# Patient Record
Sex: FEMALE | Race: WHITE | NOT HISPANIC OR LATINO | Employment: OTHER | URBAN - METROPOLITAN AREA
[De-identification: names, ages, dates, MRNs, and addresses within clinical notes are randomized per-mention and may not be internally consistent; named-entity substitution may affect disease eponyms.]

---

## 2022-09-07 ENCOUNTER — TRANSFERRED RECORDS (OUTPATIENT)
Dept: HEALTH INFORMATION MANAGEMENT | Facility: CLINIC | Age: 47
End: 2022-09-07

## 2022-09-12 ENCOUNTER — MEDICAL CORRESPONDENCE (OUTPATIENT)
Dept: HEALTH INFORMATION MANAGEMENT | Facility: CLINIC | Age: 47
End: 2022-09-12

## 2022-09-14 ENCOUNTER — TRANSCRIBE ORDERS (OUTPATIENT)
Dept: OTHER | Age: 47
End: 2022-09-14

## 2022-09-14 DIAGNOSIS — G93.5 CHIARI MALFORMATION TYPE I (H): Primary | ICD-10-CM

## 2022-09-14 DIAGNOSIS — G95.0 SYRINGOMYELIA (H): ICD-10-CM

## 2022-09-16 ENCOUNTER — TELEPHONE (OUTPATIENT)
Dept: NEUROSURGERY | Facility: CLINIC | Age: 47
End: 2022-09-16

## 2022-09-16 NOTE — TELEPHONE ENCOUNTER
Writer was not able to reach pt regarding neurosurgery referral    Please schedule a new, in person, visit with Dr. Hooks for next available in Peds neurosurgery    Luzmaria mena

## 2022-10-10 ENCOUNTER — PRE VISIT (OUTPATIENT)
Dept: NEUROSURGERY | Facility: CLINIC | Age: 47
End: 2022-10-10

## 2022-10-10 NOTE — TELEPHONE ENCOUNTER
Action 10/10/22  8:14AM   Action Taken Writer faxed request to [facility] at [phone] for all images to be pushed via PACS     Action 10/17/9:18am   Action Taken Writer faxed second request to [facility] at [phone] asking for all images to be pushed

## 2022-10-25 ENCOUNTER — OFFICE VISIT (OUTPATIENT)
Dept: NEUROSURGERY | Facility: CLINIC | Age: 47
End: 2022-10-25
Attending: FAMILY MEDICINE
Payer: COMMERCIAL

## 2022-10-25 VITALS
SYSTOLIC BLOOD PRESSURE: 119 MMHG | BODY MASS INDEX: 22.48 KG/M2 | HEART RATE: 118 BPM | WEIGHT: 134.92 LBS | DIASTOLIC BLOOD PRESSURE: 76 MMHG | HEIGHT: 65 IN

## 2022-10-25 DIAGNOSIS — G93.5 CHIARI MALFORMATION TYPE I (H): ICD-10-CM

## 2022-10-25 DIAGNOSIS — G95.0 SYRINGOMYELIA (H): ICD-10-CM

## 2022-10-25 PROCEDURE — G0463 HOSPITAL OUTPT CLINIC VISIT: HCPCS

## 2022-10-25 PROCEDURE — 99207 PR SC NO CHARGE VISIT: CPT | Performed by: NEUROLOGICAL SURGERY

## 2022-10-25 PROCEDURE — 99204 OFFICE O/P NEW MOD 45 MIN: CPT | Performed by: NEUROLOGICAL SURGERY

## 2022-10-25 RX ORDER — ZOLPIDEM TARTRATE 5 MG/1
5 TABLET ORAL
COMMUNITY
Start: 2022-10-10

## 2022-10-25 RX ORDER — LORAZEPAM 1 MG/1
1 TABLET ORAL
COMMUNITY
Start: 2022-10-10 | End: 2022-11-13

## 2022-10-25 RX ORDER — RISPERIDONE 1 MG/1
2 TABLET ORAL
COMMUNITY
Start: 2022-09-21 | End: 2023-09-26

## 2022-10-25 NOTE — PATIENT INSTRUCTIONS
You met with Pediatric Neurosurgery at the [facility]    SIGIFREDO [address]on [address]., Dr., NP      Pediatric Appointment Scheduling and Call Center:   [phone]    Nurse Practitioner  [phone]    Mailing Address  [address] [address]    Street Address   [address] [place] [address]    Fax Number  [phone]    For urgent matters that cannot wait until the next business day, occur over a holiday and/or weekend, report directly to your nearest ER or you may call [phone] and ask to page the Pediatric Neurosurgery Resident on call.

## 2022-10-25 NOTE — NURSING NOTE
"Chief Complaint   Patient presents with     Consult     \"Symptoms coming back after decompression surgery 10 year ago\"       Vitals:    10/25/22 1413   BP: 119/76   BP Location: Left arm   Patient Position: Sitting   Cuff Size: Adult Regular   Pulse: 118   Weight: 134 lb 14.7 oz (61.2 kg)   Height: 5' 4.76\" (164.5 cm)       Juliette Shabazz, EMT   October 25, 2022  "

## 2022-11-01 ENCOUNTER — TELEPHONE (OUTPATIENT)
Dept: NEUROSURGERY | Facility: CLINIC | Age: 47
End: 2022-11-01

## 2022-11-01 NOTE — TELEPHONE ENCOUNTER
Writer CÉSAR for pt to call back and schedule a follow up    Please schedule a return, video visit with Dr. Hooks for next available    Luzmaria Almaraz

## 2022-11-13 DIAGNOSIS — G93.5 CHIARI MALFORMATION TYPE I (H): Primary | ICD-10-CM

## 2022-11-13 DIAGNOSIS — F41.9 ANXIETY DUE TO INVASIVE PROCEDURE: ICD-10-CM

## 2022-11-13 RX ORDER — LORAZEPAM 1 MG/1
1 TABLET ORAL
Qty: 2 TABLET | Refills: 0 | Status: SHIPPED | OUTPATIENT
Start: 2022-11-13

## 2022-11-16 ENCOUNTER — HOSPITAL ENCOUNTER (OUTPATIENT)
Dept: MRI IMAGING | Facility: CLINIC | Age: 47
Discharge: HOME | End: 2022-11-16
Attending: NURSE PRACTITIONER
Payer: COMMERCIAL

## 2022-11-16 ENCOUNTER — HOSPITAL ENCOUNTER (OUTPATIENT)
Dept: SPEECH THERAPY | Facility: CLINIC | Age: 47
Setting detail: THERAPIES SERIES
Discharge: HOME | End: 2022-11-16
Attending: NURSE PRACTITIONER
Payer: COMMERCIAL

## 2022-11-16 DIAGNOSIS — G95.0 SYRINGOMYELIA (H): ICD-10-CM

## 2022-11-16 DIAGNOSIS — G93.5 CHIARI MALFORMATION TYPE I (H): ICD-10-CM

## 2022-11-16 PROCEDURE — 92610 EVALUATE SWALLOWING FUNCTION: CPT | Mod: GN | Performed by: SPEECH-LANGUAGE PATHOLOGIST

## 2022-11-16 PROCEDURE — 70551 MRI BRAIN STEM W/O DYE: CPT

## 2022-11-16 PROCEDURE — 72141 MRI NECK SPINE W/O DYE: CPT

## 2022-11-16 PROCEDURE — 72148 MRI LUMBAR SPINE W/O DYE: CPT

## 2022-11-16 PROCEDURE — 72146 MRI CHEST SPINE W/O DYE: CPT

## 2022-11-16 NOTE — PROGRESS NOTES
Impression: Oral [facility] exam is unremarkable. Patient demonstrates light throat clearing throughout session even prior to PO intake. Throat clearing increases with PO intake and Patient reports she had never noticed before. She denies feeling anything is stuck in her throat or that things are going down the wrong way but reports  it feels thick . She denies weight loss, globus sensation. Educated on symptoms and risks of aspiration and option to pursue a VFSS. Patient reports she would like to hold off on VFSS until MRIs are completed and she discusses again with her MD. Recommend continue with regular diet and thin liquids as it is unclear how strongly symptoms are related to swallow safety vs irritable larynx or other and Patient reports symptoms are not new and no significant history of pneumonia. Recommend Patient discuss option of pursuing a video swallow study with her primary doctor to rule out aspiration.   Clinical Swallow Study  11/16/22   General Information   Type Of Visit Initial   Start Of Care Date 11/16/22   Referring Physician Keri Jesus NP   Orders Evaluate And Treat   Medical Diagnosis Chiari malformation type I (H) (G93.5) ; Syringomyelia (H) (G95.0)   Onset Of Illness/injury Or Date Of Surgery 10/25/22  (order's date)   Precautions/limitations No Known Precautions/limitations   Hearing WFL   Pertinent History of Current Problem/OT: Additional Occupational Profile Info Per chart review  Nevaeh underwent a chiari decompression with fascia juan graft with duraplasty approximately 10 years ago in [facility] with Dr. Horn. She notes that after her surgery, she had resolution of her symptoms. She notes that approximately 5 years ago, her symptoms slowly started to come back, and notes that in the past 1 year, they have significantly gotten worse  and is doing well. She does note that she snores, has never had a sleep study. She also notes some swallowing difficulty, she said she will  "choke sometimes on her spit/thin liquids, no gag.  She is eating well, although she states that she will intermittently vomit and gag.  Patient reports choking episodes are random and she doesn t believe they are related to PO intake. She also denies feeling she is choking on saliva or that anything is going into her airway. She reports period of being unable to initiate swallow and feels this is related to her choking.  She reports she has to wait for a while and then her swallow will initiate. This causes her anxiety as she cannot communicate it while it is happening. Patient reports she had pneumonia 6 weeks ago but her doctor caught it before it impacted her lungs and doesn t believe it is related to aspiration. She is eating well and not losing weight. She says her choking episodes last about 10-20 seconds and then resolve and it happens 1-2 times a day.   Respiratory Status Room air   Patient Role/employment History Other/comments  (mily)   General Observations Patient is pleasant and cooperative and attends evaluation with her . She reports she \"doesn't know why she has this appointment\".   Patient/family Goals To figure out why she can't swallow and chokes   Clinical Swallow Evaluation   Oral Musculature generally intact   Structural Abnormalities none present   Dentition present and adequate   Mucosal Quality good   Mandibular Strength and Mobility intact   Oral Labial Strength and Mobility WFL   Lingual Strength and Mobility WFL   Velar Elevation intact   Buccal Strength and Mobility intact   Laryngeal Function Throat clear   Clinical Swallow Eval: Thin Liquid Texture Trial   Mode of Presentation, Thin Liquids cup;self-fed   Volume of Liquid or Food Presented 4 oz of applejuice   Oral Phase of Swallow WFL   Pharyngeal Phase of Swallow throat clearing   Diagnostic Statement Patient throat clears before bolus enters her mouth. She throat clears after as well on 80% of trials.   Clinical Swallow " Eval: Puree Solid Texture Trial   Mode of Presentation, Puree spoon;self-fed   Volume of Puree Presented 4 tablespoons   Oral Phase, Puree WFL   Pharyngeal Phase, Puree throat clearing   Diagnostic Statement Patient throat clears before bolus enters her mouth. She throat clears after as well on 80% of trials.   Clinical Swallow Eval: Regular (Solid)   Mode of Presentation self-fed   Volume Presented 1 cyn cracker   Oral Phase WFL   Pharyngeal Phase throat clearing   Diagnostic Statement Patient throat clears before bolus enters her mouth. She throat clears after as well on 80% of trials.   Educational Assessment   Barriers to Learning No barriers   Esophageal Phase of Swallow   Patient reports or presents with symptoms of esophageal dysphagia No   Swallow Eval: Clinical Impressions   Skilled Criteria for Therapy Intervention Patient/family refuse skilled intervention at this time;Other (see comments)  (decline VFSS until after MRI)   Diet texture recommendations Thin liquids (level 0);Regular diet   Risks and Benefits of Treatment have been explained. Yes   Patient, family and/or staff in agreement with Plan of Care Yes   Clinical Impression Comments Oral mech exam is unremarkable. Patient demonstrates light throat clearing throughout session even prior to PO intake. Throat clearing increases with PO intake and Patient reports she had never noticed before. She denies feeling anything is stuck in her throat or that things are going down the wrong way but reports  it feels thick . She denies weight loss, globus sensation. Educated on symptoms and risks of aspiration and option to pursue a VFSS. Patient reports she would like to hold off on VFSS until MRIs are completed and she discusses again with her MD. Recommend continue with regular diet and thin liquids as it is unclear how strongly symptoms are related to swallow safety vs irritable larynx or other and Patient reports symptoms are not new and no significant  history of pneumonia. Recommend Patient discuss option of pursuing a video swallow study with her primary doctor to rule out aspiration.   Total Session Time   Total Evaluation Time 45   Therapy Certification   Certification date from 11/16/22   Certification date to 11/16/22   Medical Diagnosis Chiari malformation type I (H) (G93.5) ; Syringomyelia (H) (G95.0)   Certification I certify the need for these services furnished under this plan of treatment and while under my care.  (Physician co-signature of this document indicates review and certification of the therapy plan).

## 2022-11-29 ENCOUNTER — TELEPHONE (OUTPATIENT)
Dept: NEUROSURGERY | Facility: CLINIC | Age: 47
End: 2022-11-29

## 2022-11-29 NOTE — TELEPHONE ENCOUNTER
Writer spoke with pt regarding a follow up with Dr. Hooks. Pt asked about sleep study and became frustrated that a consult is needed prior to the study. The pt said she would call back to discuss at another time.    Please send a message via Teams or TE to writer with best time to call pt back.    Luzmaria Almaraz

## 2022-12-22 NOTE — PROGRESS NOTES
Neurosurgery Clinic    Dear Dr. Ellison,   Thank you for referring Nevaeh Graham to the neurosurgery clinic at the [facility].   I had the opportunity to meet with Nevaeh Graham on October 25, 2022.    As you know, Nevaeh is a 47 year old female who presents today for initial consultation and discussion of chiari malformation. Nevaeh underwent a chiari decompression with fascia juan graft with duraplasty approximately 10 years ago in [facility] with Dr. Horn. She notes that after her surgery, she had resolution of her symptoms. She notes that approximately 5 years ago, her symptoms slowly started to come back, and notes that in the past 1 year, they have significantly gotten worse. She has 2 types of headaches, one at the base of her skull that feels like a squeezing pain that radiates upwards bands around her head that is significantly worse with valsalva, pushing/pulling activities. She reports those occur approximately 3-10 times per day. She notes an additional type of headache with lots of pressure at her neck, she feels like she has to dig her fingers to release her pressure, which occur 3x/week. She notes nothing typically helps her pain, although she tries rest, works through it or applies cold packs. She notes that massage doesn't help typically. She does note dizziness when she wakes up in the morning approximately 50% of the time when she opens her eyes, but notes that in the morning she has to lie in bed for approximately 1 hour before getting up to see if she can combat this symptom. She states if she does have dizziness, she will typically get headaches with those as well. She does not have headaches that wake her from sleep. She does note that she has change in her gait and has noted some bilateral leg weakness over the past 6-7 months. She notes that she has had a difficult time walking up stairs, does better walking down. She notes  "numbness/tingling on her right side from shoulders to fingertips, down her sides and to her legs (unsure of how low her pain goes). She does note that she had fallen, slipping on her stairs and hitting the back of her head, as well as slipping and falling on water in her kitchen and sustained concussions. She also notes that in September, she started a new medication and had a drop attack.  She denies any changes in fine motor movement, she is a  and is doing well. She does note that she snores, has never had a sleep study. She also notes some swallowing difficulty, she said she will choke sometimes on her spit/thin liquids, no gag.  She is eating well, although she states that she will intermittently vomit and gag. She does note that she has some confusion, forgetful of words, difficulty processing information. She states that her vision has been changing slightly, saw ophthalmology about 1 year ago, she does not wear glasses. She did mention that she follows closely with psychologist and has been trialing multiple medications      No past medical history on file.       No past surgical history on file.    ALLERGIES:  Latex, Tramadol, Ketorolac tromethamine, and Metoclopramide    Current Outpatient Medications   Medication Sig Dispense Refill     aspirin (ASA) 81 MG EC tablet Take 81 mg by mouth daily       LORazepam (ATIVAN) 1 MG tablet Take 1 mg by mouth       risperiDONE (RISPERDAL) 1 MG tablet Take 2 mg by mouth       zolpidem (AMBIEN) 5 MG tablet Take 5 mg by mouth         No family history on file.    Social History     Tobacco Use     Smoking status: Not on file     Smokeless tobacco: Not on file   Substance Use Topics     Alcohol use: Not on file         PHYSICAL EXAM:   /76 (BP Location: Left arm, Patient Position: Sitting, Cuff Size: Adult Regular)   Pulse 118   Ht 1.645 m (5' 4.76\")   Wt 61.2 kg (134 lb 14.7 oz)   BMI 22.62 kg/m      Awake and alert, no acute distress  PERRL, EOMi, face " symmetrical, tongue midline  No gag reflex  MAEx4, full strength bilaterally  Gait is normal  Reflexes 2+, mildly decreased sensation on right  Posterior neck incision well healed, no redness, swelling or drainage noted    IMAGES: none    ASSESSMENT:  Nevaeh Graham, 47 year old female with history of chiari malformation with duraplasty in 2012 with return and worsening of symptoms    PLAN:  - we will obtain MRI brain including craniocervical junction and full spine and follow up with us after  -attempt to coordinate swallow evaluation and sleep study  - Nevaeh Graham and family were counseled to please contact us with any acute worsening of symptoms, or with any questions or concerns.       Keri Jesus NP  Department of Neurosurgery  [phone]    This patient was discussed with neurosurgery faculty, who agrees with the above.  Keri Jesus NP on 10/25/2022 at 3:00 PM     Include Pregnancy/Lactation Warning?: No

## 2023-02-06 NOTE — PROGRESS NOTES
Does Nevaeh have a CPAP/Bipap?  No     [place] Sleep Scale: 7  BMI: 22.62    Nevaeh is a 47 year old who is being evaluated via a billable video visit.      How would you like to obtain your AVS? MyChart  If the video visit is dropped, the invitation should be resent by: Text to cell phone: [phone]  Will anyone else be joining your video visit? No              Subjective   Nevaeh is a 47 year old, presenting for the following health issues:  Sleep Problem ( Chiari malformation type I Syringomyelia)      Objective           Vitals:  No vitals were obtained today due to virtual visit.    Physical Exam   GENERAL: Healthy, alert and no distress  EYES: Eyes grossly normal to inspection.  No discharge or erythema, or obvious scleral/conjunctival abnormalities.  RESP: No audible wheeze, cough, or visible cyanosis.  No visible retractions or increased work of breathing.    SKIN: Visible skin clear. No significant rash, abnormal pigmentation or lesions.  NEURO: Cranial nerves grossly intact.  Mentation and speech appropriate for age.  PSYCH: Mentation appears normal, affect normal/bright, judgement and insight intact, normal speech and appearance well-groomed.            Video-Visit Details    Type of service:  Video Visit     Originating Location (pt. Location): Home    Distant Location (provider location):  On-site  Platform used for Video Visit: [facility]    Outpatient Sleep Medicine Consultation:      Name: Nevaeh Graham MRN# [id number]   Age: 47 year old YOB: 1975     Date of Consultation: February 8, 2023  Consultation is requested by: Keri Jesus NP  [facility]  [address] [place] [id number] Keri Jesus  Primary care provider: Miroslava Ellison       Reason for Sleep Consult:     Nevaeh Graham is sent by Keri Jesus for a sleep consultation regarding possible sleep apnea. Referred per neurology. Hx Chiari malformation, insomnia, loud snoring, witnessed apneas.  .    Patient s Reason for visit  Nevaeh Graham main reason for visit: Sleep Apnea  Patient states problem(s) started: As long as I can remember  Nevaeh Graham's goals for this visit: To find a solution           Assessment and Plan:     Summary Sleep Diagnoses:  Chronic Insomnia- sleep onset/sleep maintenance  Suspected sleep apnea- increased risk due to Chiari malformation, symptoms consistent with LEYLA.     Comorbid Diagnoses:  Chiari Malformation  Bipolar disorder  HTN  Anxiety/depression  hypothyroidism      Summary Recommendations:  Lab study for further evaluation of multiple sleep co morbidities, suspected sleep apnea related to Chiari malformation.   Insomnia- consider sleep psychology referral after testing is complete  No orders of the defined types were placed in this encounter.        Summary Counseling:    Sleep Testing Reviewed  Obstructive Sleep Apnea Reviewed  Complications of Untreated Sleep Apnea Reviewed      Medical Decision-making:   Educational materials provided in instructions    Total time spent reviewing medical records, history and physical examination, review of previous testing and interpretation as well as documentation on this date:50 min    CC: Keri Jesus          History of Present Illness:     Past Sleep Evaluations:    SLEEP-WAKE SCHEDULE:     Work/School Days: Patient goes to school/work: Yes   Usually gets into bed at 10  Takes patient about 2 hours to fall asleep  Has trouble falling asleep 7 nights per week  Wakes up in the middle of the night 3-4 times times.  Wakes up due to Snorting self awake;Anxiety  She has trouble falling back asleep 7 times a week.   It usually takes 30/45 minutes to get back to sleep  Patient is usually up at 8-9 am  Uses alarm: Yes    Weekends/Non-work Days/All Other Days:  Usually gets into bed at 9:00   Takes patient about 2-3 Hours to fall asleep  Patient is usually up at 8/9 am  Uses alarm: Yes    Sleep Need  Patient gets  10 Hours sleep on  average   Patient thinks she needs about 10-11 Hours sleep    Nevaeh Graham prefers to sleep in this position(s): Side   Patient states they do the following activities in bed: Read    Naps  Patient takes a purposeful nap 4 times a week and naps are usually 2 hours in duration  She feels better after a nap: No  She dozes off unintentionally 0 days per week  Patient has had a driving accident or near-miss due to sleepiness/drowsiness: No      SLEEP DISRUPTIONS:    Breathing/Snoring  Patient snores:Yes  Other people complain about her snoring: Yes  Patient has been told she stops breathing in her sleep:Yes  She has issues with the following: Morning mouth dryness;Stuffy nose when you wake up    Movement:  Patient gets pain, discomfort, with an urge to move:  No  It happens when she is resting:  No  It happens more at night:  No  Patient has been told she kicks her legs at night:  No     Behaviors in Sleep:  Nevaeh Graham has experienced the following behaviors while sleeping:    She has experienced sudden muscle weakness during the day: Yes      Is there anything else you would like your sleep provider to know:          CAFFEINE AND OTHER SUBSTANCES:    Patient consumes caffeinated beverages per day:  2 Diet Cokes  Last caffeine use is usually: 5 pm  List of any prescribed or over the counter stimulants that patient takes: Ambien  List of any prescribed or over the counter sleep medication patient takes: Tylenol PM  List of previous sleep medications that patient has tried:    Patient drinks alcohol to help them sleep: No  Patient drinks alcohol near bedtime: No    Family History:  Patient has a family member been diagnosed with a sleep disorder: Yes  Mother and Father         SCALES:    EPWORTH SLEEPINESS SCALE      [place] Sleepiness Scale ( OSCAR [facility]  6538-2041<br>ESS - [place]/English - Final version - 21 Nov 07 - [facility] [place].) 2/8/2023   Sitting and reading Would never doze   Watching TV Moderate  chance of dozing   Sitting, inactive in a public place (e.g. a theatre or a meeting) Would never doze   As a passenger in a car for an hour without a break Slight chance of dozing   Lying down to rest in the afternoon when circumstances permit High chance of dozing   Sitting and talking to someone Would never doze   Sitting quietly after a lunch without alcohol Slight chance of dozing   In a car, while stopped for a few minutes in traffic Would never doze   [place] Score (MC) 7   [place] Score (Sleep) 7         INSOMNIA SEVERITY INDEX (SHANE)      Insomnia Severity Index (SHANE) 2/6/2023   Difficulty falling asleep 3   Difficulty staying asleep 4   Problems waking up too early 3   How SATISFIED/DISSATISFIED are you with your CURRENT sleep pattern? 4   How NOTICEABLE to others do you think your sleep problem is in terms of impairing the quality of your life? 3   How WORRIED/DISTRESSED are you about your current sleep problem? 4   To what extent do you consider your sleep problem to INTERFERE with your daily functioning (e.g. daytime fatigue, mood, ability to function at work/daily chores, concentration, memory, mood, etc.) CURRENTLY? 4   SHANE Total Score 25       Guidelines for Scoring/Interpretation:  Total score categories:  0-7 = No clinically significant insomnia   8-14 = Subthreshold insomnia   15-21 = Clinical insomnia (moderate severity)  22-28 = Clinical insomnia (severe)  Used via courtesy of www.[employer].va.gov with permission from Yunier Madsen PhD., [facility]      STOP BANG     STOP BANG Questionnaire (  2008, the American Society of Anesthesiologists, Inc. [employer] 2/8/2023   1. Snoring - Do you snore loudly (louder than talking or loud enough to be heard through closed doors)? Yes   2. Tired - Do you often feel tired, fatigued, or sleepy during daytime? Yes   3. Observed - Has anyone observed you stop breathing during your sleep? Yes   4. Blood pressure - Do you have or  "are you being treated for high blood pressure? Yes   5. BMI - BMI more than 35 kg/m2? No   6. Age - Age over 50 yr old? No   7. Neck circumference - Neck circumference greater than 40 cm? No   8. Gender - Gender male? No   STOP BANG Score (MC): 5 (High risk of LEYLA)   B/P Clinic: -   BMI Clinic: -         GAD7    No flowsheet data found.      CAGE-AID    No flowsheet data found.    CAGE-AID reprinted with permission from the [place] Medical Journal, CINDY Berry. and MARIBEL Luong, \"Conjoint screening questionnaires for alcohol and drug abuse\" [place] Medical Journal 94: 135-140, 1995.      PATIENT HEALTH QUESTIONNAIRE-9 (PHQ - 9)    No flowsheet data found.    Developed by James Boykin, Joleen Schaffer, Rolando Belcher and colleagues, with an educational pato from [employer] No permission required to reproduce, translate, display or distribute.        Allergies:    Allergies   Allergen Reactions     Latex Hives and Rash     Tramadol Other (See Comments)     agitation and visual disturbance     Ketorolac Tromethamine Other (See Comments)     agitation       Metoclopramide Other (See Comments)     agitation         Medications:    Current Outpatient Medications   Medication Sig Dispense Refill     aspirin (ASA) 81 MG EC tablet Take 81 mg by mouth daily       LORazepam (ATIVAN) 1 MG tablet Take 1 tablet (1 mg) by mouth once as needed for anxiety 2 tablet 0     risperiDONE (RISPERDAL) 1 MG tablet Take 2 mg by mouth       zolpidem (AMBIEN) 5 MG tablet Take 5 mg by mouth         Problem List:  There are no problems to display for this patient.       Past Medical/Surgical History:  No past medical history on file.  No past surgical history on file.    Social History:  Social History     Socioeconomic History     Marital status:      Spouse name: Not on file     Number of children: Not on file     Years of education: Not on file     Highest education level: Not on file   Occupational History     Not on " file   Tobacco Use     Smoking status: Not on file     Smokeless tobacco: Not on file   Substance and Sexual Activity     Alcohol use: Not on file     Drug use: Not on file     Sexual activity: Not on file   Other Topics Concern     Not on file   Social History Narrative     Not on file     Social Determinants of Health     Financial Resource Strain: Not on file   Food Insecurity: Not on file   Transportation Needs: Not on file   Physical Activity: Not on file   Stress: Not on file   Social Connections: Not on file   Intimate Partner Violence: Not on file   Housing Stability: Not on file       Family History:  No family history on file.    Review of Systems:  A complete review of systems reviewed by me is negative with the exeption of what has been mentioned in the history of present illness.  In the last TWO WEEKS have you experienced any of the following symptoms?  Fevers: No  Night Sweats: Yes  Weight Gain: Yes  Pain at Night: No  Double Vision: Yes  Changes in Vision: Yes  Difficulty Breathing through Nose: Yes  Sore Throat in Morning: Yes  Dry Mouth in the Morning: Yes  Shortness of Breath Lying Flat: Yes  Shortness of Breath With Activity: Yes  Awakening with Shortness of Breath: No  Increased Cough: No  Heart Racing at Night: Yes  Swelling in Feet or Legs: No  Diarrhea at Night: No  Heartburn at Night: No  Urinating More than Once at Night: No  Losing Control of Urine at Night: No  Joint Pains at Night: No  Headaches in Morning: No  Weakness in Arms or Legs: No  Depressed Mood: Yes  Anxiety: Yes     Physical Examination:  Vitals: There were no vitals taken for this visit.  BMI= There is no height or weight on file to calculate BMI.                    Data: All pertinent previous laboratory data reviewed     No results for input(s): NA, POTASSIUM, CHLORIDE, CO2, ANIONGAP, GLC, BUN, CR, BELLA in the last 11810 hours.    No results for input(s): WBC, RBC, HGB, HCT, MCV, MCH, MCHC, RDW, PLT in the last 60098  hours.    No results for input(s): PROTTOTAL, ALBUMIN, BILITOTAL, ALKPHOS, AST, ALT, BILIDIRECT in the last 62927 hours.    No results found for: TSH    No results found for: UAMP, UBARB, BENZODIAZEUR, UCANN, UCOC, OPIT, UPCP    No results found for: IRONSAT, [id number], KAN    No results found for: PH, PHARTERIAL, PO2, [id number], SAT, PCO2, HCO3, BASEEXCESS, RENAY, BEB    @LABRCNTIPR(phv:4,pco2v:4,po2v:4,hco3v:4,kylah:4,o2per:4)@    Echocardiology: No results found for this or any previous visit (from the past 4320 hour(s)).    Chest x-ray: No results found for this or any previous visit from the past 365 days.      Chest CT: No results found for this or any previous visit from the past 365 days.      PFT: Most Recent [facility] Pulmonary Function Testing    No results found for: 20001  No results found for: 20002  No results found for: 20003  No results found for: 20015  No results found for: 20016  No results found for: 20027  No results found for: 20028  No results found for: 20029  No results found for: 20079  No results found for: 20080  No results found for: 20081  No results found for: 20335  No results found for: 20105  No results found for: 20053  No results found for: 20054  No results found for: 20055      Nena Leonardo CMA 2/8/2023

## 2023-02-08 ENCOUNTER — VIRTUAL VISIT (OUTPATIENT)
Dept: SLEEP MEDICINE | Facility: CLINIC | Age: 48
End: 2023-02-08
Attending: NURSE PRACTITIONER

## 2023-02-08 DIAGNOSIS — G47.10 HYPERSOMNIA: ICD-10-CM

## 2023-02-08 DIAGNOSIS — G93.5 CHIARI MALFORMATION TYPE I (H): ICD-10-CM

## 2023-02-08 DIAGNOSIS — F51.04 PSYCHOPHYSIOLOGICAL INSOMNIA: ICD-10-CM

## 2023-02-08 DIAGNOSIS — R29.818 SUSPECTED SLEEP APNEA: Primary | ICD-10-CM

## 2023-02-08 DIAGNOSIS — G95.0 SYRINGOMYELIA (H): ICD-10-CM

## 2023-02-08 PROCEDURE — 99204 OFFICE O/P NEW MOD 45 MIN: CPT | Mod: 95 | Performed by: PHYSICIAN ASSISTANT

## 2023-02-08 ASSESSMENT — SLEEP AND FATIGUE QUESTIONNAIRES
HOW LIKELY ARE YOU TO NOD OFF OR FALL ASLEEP WHILE SITTING QUIETLY AFTER LUNCH WITHOUT ALCOHOL: SLIGHT CHANCE OF DOZING
HOW LIKELY ARE YOU TO NOD OFF OR FALL ASLEEP WHILE SITTING AND TALKING TO SOMEONE: WOULD NEVER DOZE
HOW LIKELY ARE YOU TO NOD OFF OR FALL ASLEEP WHILE WATCHING TV: MODERATE CHANCE OF DOZING
HOW LIKELY ARE YOU TO NOD OFF OR FALL ASLEEP WHILE LYING DOWN TO REST IN THE AFTERNOON WHEN CIRCUMSTANCES PERMIT: HIGH CHANCE OF DOZING
HOW LIKELY ARE YOU TO NOD OFF OR FALL ASLEEP IN A CAR, WHILE STOPPED FOR A FEW MINUTES IN TRAFFIC: WOULD NEVER DOZE
HOW LIKELY ARE YOU TO NOD OFF OR FALL ASLEEP WHILE SITTING AND READING: WOULD NEVER DOZE
HOW LIKELY ARE YOU TO NOD OFF OR FALL ASLEEP WHILE SITTING INACTIVE IN A PUBLIC PLACE: WOULD NEVER DOZE
HOW LIKELY ARE YOU TO NOD OFF OR FALL ASLEEP WHEN YOU ARE A PASSENGER IN A CAR FOR AN HOUR WITHOUT A BREAK: SLIGHT CHANCE OF DOZING

## 2023-02-08 NOTE — PATIENT INSTRUCTIONS
"      MY TREATMENT INFORMATION FOR SLEEP APNEA-  Nevaeh Graham    DOCTOR : SANTANA Pace-TORSTEN    Am I having a sleep study at a sleep center?  --->Due to normal delays, you will be contacted within 2-4 weeks to schedule    Am I having a home sleep study?  --->Watch the video for the device you are using:    -/drop off device-   https://www.FieldAware.com/watch?v=yGGFBdELGhk    -Disposable device sent out require phone/computer application-   https://www.FieldAware.com/watch?v=BCce_vbiwxE      Frequently asked questions:  1. What is Obstructive Sleep Apnea (LEYLA)? LEYLA is the most common type of sleep apnea. Apnea means, \"without breath.\"  Apnea is most often caused by narrowing or collapse of the upper airway as muscles relax during sleep.   Almost everyone has occasional apneas. Most people with sleep apnea have had brief interruptions at night frequently for many years.  The severity of sleep apnea is related to how frequent and severe the events are.   2. What are the consequences of LEYLA? Symptoms include: feeling sleepy during the day, snoring loudly, gasping or stopping of breathing, trouble sleeping, and occasionally morning headaches or heartburn at night.  Sleepiness can be serious and even increase the risk of falling asleep while driving. Other health consequences may include development of high blood pressure and other cardiovascular disease in persons who are susceptible. Untreated LEYLA  can contribute to heart disease, stroke and diabetes.   3. What are the treatment options? In most situations, sleep apnea is a lifelong disease that must be managed with daily therapy. Medications are not effective for sleep apnea and surgery is generally not considered until other therapies have been tried. Your treatment is your choice . Continuous Positive Airway (CPAP) works right away and is the therapy that is effective in nearly everyone. An oral device to hold your jaw forward is usually the next most reliable " option. Other options include postioning devices (to keep you off your back), weight loss, and surgery including a tongue pacing device. There is more detail about some of these options below.  4. Are my sleep studies covered by insurance? Although we will request verification of coverage, we advise you also check in advance of the study to ensure there is coverage.    Important tips for those choosing CPAP and similar devices   Know your equipment:  CPAP is continuous positive airway pressure that prevents obstructive sleep apnea by keeping the throat from collapsing while you are sleeping. In most cases, the device is  smart  and can slowly self-adjusts if your throat collapses and keeps a record every day of how well you are treated-this information is available to you and your care team.  BPAP is bilevel positive airway pressure that keeps your throat open and also assists each breath with a pressure boost to maintain adequate breathing.  Special kinds of BPAP are used in patients who have inadequate breathing from lung or heart disease. In most cases, the device is  smart  and can slowly self-adjusts to assist breathing. Like CPAP, the device keeps a record of how well you are treated.  Your mask is your connection to the device. You get to choose what feels most comfortable and the staff will help to make sure if fits. Here: are some examples of the different masks that are available:       Key points to remember on your journey with sleep apnea:  Sleep study.  PAP devices often need to be adjusted during a sleep study to show that they are effective and adjusted right.  Good tips to remember: Try wearing just the mask during a quiet time during the day so your body adapts to wearing it. A humidifier is recommended for comfort in most cases to prevent drying of your nose and throat. Allergy medication from your provider may help you if you are having nasal congestion.  Getting settled-in. It takes more than  one night for most of us to get used to wearing a mask. Try wearing just the mask during a quiet time during the day so your body adapts to wearing it. A humidifier is recommended for comfort in most cases. Our team will work with you carefully on the first day and will be in contact within 4 days and again at 2 and 4 weeks for advice and remote device adjustments. Your therapy is evaluated by the device each day.   Use it every night. The more you are able to sleep naturally for 7-8 hours, the more likely you will have good sleep and to prevent health risks or symptoms from sleep apnea. Even if you use it 4 hours it helps. Occasionally all of us are unable to use a medical therapy, in sleep apnea, it is not dangerous to miss one night.   Communicate. Call our skilled team on the number provided on the first day if your visit for problems that make it difficult to wear the device. Over 2 out of 3 patients can learn to wear the device long-term with help from our team. Remember to call our team or your sleep providers if you are unable to wear the device as we may have other solutions for those who cannot adapt to mask CPAP therapy. It is recommended that you sleep your sleep provider within the first 3 months and yearly after that if you are not having problems.   Use it for your health. We encourage use of CPAP masks during daytime quiet periods to allow your face and brain to adapt to the sensation of CPAP so that it will be a more natural sensation to awaken to at night or during naps. This can be very useful during the first few weeks or months of adapting to CPAP though it does not help medically to wear CPAP during wakefulness and  should not be used as a strategy just to meet guidelines.  Take care of your equipment. Make sure you clean your mask and tubing using directions every day and that your filter and mask are replaced as recommended or if they are not working.     BESIDES CPAP, WHAT OTHER THERAPIES  ARE THERE?    Positioning Device  Positioning devices are generally used when sleep apnea is mild and only occurs on your back.This example shows a pillow that straps around the waist. It may be appropriate for those whose sleep study shows milder sleep apnea that occurs primarily when lying flat on one's back. Preliminary studies have shown benefit but effectiveness at home may need to be verified by a home sleep test. These devices are generally not covered by medical insurance.  Examples of devices that maintain sleeping on the back to prevent snoring and mild sleep apnea.    Belt type body positioner  http://[email or web address]    Electronic reminder  http://nightshifttherapy.com/            Oral Appliance  What is oral appliance therapy?  An oral appliance device fits on your teeth at night like a retainer used after having braces. The device is made by a specialized dentist and requires several visits over 1-2 months before a manufactured device is made to fit your teeth and is adjusted to prevent your sleep apnea. Once an oral device is working properly, snoring should be improved. A home sleep test may be recommended at that time if to determine whether the sleep apnea is adequately treated.       Some things to remember:  -Oral devices are often, but not always, covered by your medical insurance. Be sure to check with your insurance provider.   -If you are referred for oral therapy, you will be given a list of specialized dentists to consider or you may choose to visit the Web site of the American Academy of Dental Sleep Medicine  -Oral devices are less likely to work if you have severe sleep apnea or are extremely overweight.     More detailed information  An oral appliance is a small acrylic device that fits over the upper and lower teeth  (similar to a retainer or a mouth guard). This device slightly moves jaw forward, which moves the base of the tongue forward, opens the airway, improves breathing for  effective treat snoring and obstructive sleep apnea in perhaps 7 out of 10 people .  The best working devices are custom-made by a dental device  after a mold is made of the teeth 1, 2, 3.  When is an oral appliance indicated?  Oral appliance therapy is recommended as a first-line treatment for patients with primary snoring, mild sleep apnea, and for patients with moderate sleep apnea who prefer appliance therapy to use of CPAP4, 5. Severity of sleep apnea is determined by sleep testing and is based on the number of respiratory events per hour of sleep.   How successful is oral appliance therapy?  The success rate of oral appliance therapy in patients with mild sleep apnea is 75-80% while in patients with moderate sleep apnea it is 50-70%. The chance of success in patients with severe sleep apnea is 40-50%. The research also shows that oral appliances have a beneficial effect on the cardiovascular health of LEYLA patients at the same magnitude as CPAP therapy7.  Oral appliances should be a second-line treatment in cases of severe sleep apnea, but if not completely successful then a combination therapy utilizing CPAP plus oral appliance therapy may be effective. Oral appliances tend to be effective in a broad range of patients although studies show that the patients who have the highest success are females, younger patients, those with milder disease, and less severe obesity. 3, 6.   Finding a dentist that practices dental sleep medicine  Specific training is available through the American Academy of Dental Sleep Medicine for dentists interested in working in the field of sleep. To find a dentist who is educated in the field of sleep and the use of oral appliances, near you, visit the Web site of the American Academy of Dental Sleep Medicine.    References  1. Hugo et al. Objectively measured vs self-reported compliance during oral appliance therapy for sleep-disordered breathing. Chest 2013; 144(5):  3635-4955.  2. Loulou et al. Objective measurement of compliance during oral appliance therapy for sleep-disordered breathing. Thorax 2013; 68(1): 91-96.  3. Jose Luis et al. Mandibular advancement devices in 620 men and women with LEYLA and snoring: tolerability and predictors of treatment success. Chest 2004; 125: 4060-6488.  4. Angelica, et al. Oral appliances for snoring and LEYLA: a review. Sleep 2006; 29: 244-262.  5. North et al. Oral appliance treatment for LEYLA: an update. J Clin Sleep Med 2014; 10(2): 215-227.  6. Brisa et al. Predictors of OSAH treatment outcome. J Dent Res 2007; 86: 8950-4885.      Weight Loss:    Weight loss is a long-term strategy that may improve sleep apnea in some patients.    Weight management is a personal decision and the decision should be based on your interest and the potential benefits.  If you are interested in exploring weight loss strategies, the following discussion covers the impact on weight loss on sleep apnea and the approaches that may be successful.    Being overweight does not necessarily mean you will have health consequences.  Those who have BMI over 35 or over 27 with existing medical conditions carries greater risk.   Weight loss decreases severity of sleep apnea in most people with obesity. For those with mild obesity who have developed snoring with weight gain, even 15-30 pound weight loss can improve and occasionally eliminate sleep apnea.  Structured and life-long dietary and health habits are necessary to lose weight and keep healthier weight levels.     Though there may be significant health benefits from weight loss, long-term weight loss is very difficult to achieve- studies show success with dietary management in less than 10% of people. In addition, substantial weight loss may require years of dietary control and may be difficult if patients have severe obesity. In these cases, surgical management may be considered.  Finally, older  individuals who have tolerated obesity without health complications may be less likely to benefit from weight loss strategies.      [unfilled]    Surgery:    Surgery for obstructive sleep apnea is considered generally only when other therapies fail to work. Surgery may be discussed with you if you are having a difficult time tolerating CPAP and or when there is an abnormal structure that requires surgical correction.  Nose and throat surgeries often enlarge the airway to prevent collapse.  Most of these surgeries create pain for 1-2 weeks and up to half of the most common surgeries are not effective throughout life.  You should carefully discuss the benefits and drawbacks to surgery with your sleep provider and surgeon to determine if it is the best solution for you.   More information  Surgery for LEYLA is directed at areas that are responsible for narrowing or complete obstruction of the airway during sleep.  There are a wide range of procedures available to enlarge and/or stabilize the airway to prevent blockage of breathing in the three major areas where it can occur: the palate, tongue, and nasal regions.  Successful surgical treatment depends on the accurate identification of the factors responsible for obstructive sleep apnea in each person.  A personalized approach is required because there is no single treatment that works well for everyone.  Because of anatomic variation, consultation with an examination by a sleep surgeon is a critical first step in determining what surgical options are best for each patient.  In some cases, examination during sedation may be recommended in order to guide the selection of procedures.  Patients will be counseled about risks and benefits as well as the typical recovery course after surgery. Surgery is typically not a cure for a person s LEYLA.  However, surgery will often significantly improve one s LEYLA severity (termed  success rate ).  Even in the absence of a cure, surgery  will decrease the cardiovascular risk associated with OSA7; improve overall quality of life8 (sleepiness, functionality, sleep quality, etc).      Palate Procedures:  Patients with LEYLA often have narrowing of their airway in the region of their tonsils and uvula.  The goals of palate procedures are to widen the airway in this region as well as to help the tissues resist collapse.  Modern palate procedure techniques focus on tissue conservation and soft tissue rearrangement, rather than tissue removal.  Often the uvula is preserved in this procedure. Residual sleep apnea is common in patient after pharyngoplasty with an average reduction in sleep apnea events of 33%2.      Tongue Procedures:  ExamWhile patients are awake, the muscles that surround the throat are active and keep this region open for breathing. These muscles relax during sleep, allowing the tongue and other structures to collapse and block breathing.  There are several different tongue procedures available.  Selection of a tongue base procedure depends on characteristics seen on physical exam.  Generally, procedures are aimed at removing bulky tissues in this area or preventing the back of the tongue from falling back during sleep.  Success rates for tongue surgery range from 50-62%3.    Hypoglossal Nerve Stimulation:  Hypoglossal nerve stimulation has recently received approval from the [place] Food and Drug Administration for the treatment of obstructive sleep apnea.  This is based on research showing that the system was safe and effective in treating sleep apnea6.  Results showed that the median AHI score decreased 68%, from 29.3 to 9.0. This therapy uses an implant system that senses breathing patterns and delivers mild stimulation to airway muscles, which keeps the airway open during sleep.  The system consists of three fully implanted components: a small generator (similar in size to a pacemaker), a breathing sensor, and a stimulation lead.   Using a small handheld remote, a patient turns the therapy on before bed and off upon awakening.    Candidates for this device must be greater than 18 years of age, have moderate to severe LEYLA (AHI between 15-65), BMI less than 35, have tried CPAP/oral appliance for at least 8 weeks without success, and have appropriate upper airway anatomy (determined by a sleep endoscopy performed by Dr. Ben Joseph).    Hypoglossal Nerve Stimulation Pathway:    The sleep surgeon s office will work with the patient through the insurance prior-authorization process (including communications and appeals).    Nasal Procedures:  Nasal obstruction can interfere with nasal breathing during the day and night.  Studies have shown that relief of nasal obstruction can improve the ability of some patients to tolerate positive airway pressure therapy for obstructive sleep apnea1.  Treatment options include medications such as nasal saline, topical corticosteroid and antihistamine sprays, and oral medications such as antihistamines or decongestants. Non-surgical treatments can include external nasal dilators for selected patients. If these are not successful by themselves, surgery can improve the nasal airway either alone or in combination with these other options.      Combination Procedures:  Combination of surgical procedures and other treatments may be recommended, particularly if patients have more than one area of narrowing or persistent positional disease.  The success rate of combination surgery ranges from 66-80%2,3.    References  Rocio SARMIENTO. The Role of the Nose in Snoring and Obstructive Sleep Apnoea: An Update.  Eur Arch Otorhinolaryngol. 2011; 268: 1365-73.   Rhianna SM; Thanh JA; Vernon JR; Pallanch JF; Melinda MB; Rosa SG; Asif EMERSON. Surgical modifications of the upper airway for obstructive sleep apnea in adults: a systematic review and meta-analysis. SLEEP 2010;33(10):7971-6991. Jony MADDEN. Hypopharyngeal surgery in  obstructive sleep apnea: an evidence-based medicine review.  Arch Otolaryngol Head Neck Surg. 2006 Feb;132(2):206-13.  Cy YH1, Adalgisa Y, Matt CECILIO. The efficacy of anatomically based multilevel surgery for obstructive sleep apnea. Otolaryngol Head Neck Surg. 2003 Oct;129(4):327-35.  Jony MADDEN, Goldberg A. Hypopharyngeal Surgery in Obstructive Sleep Apnea: An Evidence-Based Medicine Review. Arch Otolaryngol Head Neck Surg. 2006 Feb;132(2):206-13.  Brandy ESQUIVEL et al. Upper-Airway Stimulation for Obstructive Sleep Apnea.  N Engl J Med. 2014 Jan 9;370(2):139-49.  Migel Y et al. Increased Incidence of Cardiovascular Disease in Middle-aged Men with Obstructive Sleep Apnea. Am J Respir Crit Care Med; 2002 166: 159-165  Adkinsisaac GALVAN et al. Studying Life Effects and Effectiveness of Palatopharyngoplasty (SLEEP) study: Subjective Outcomes of Isolated Uvulopalatopharyngoplasty. Otolaryngol Head Neck Surg. 2011; 144: 623-631.        WHAT IF I ONLY HAVE SNORING?    Mandibular advancement devices, lateral sleep positioning, long-term weight loss and treatment of nasal allergies have been shown to improve snoring.  Exercising tongue muscles with a game (Scan & Targetttps://CureLauncher.StepsAway/us/david/soundly-reduce-snoring/xk9952624495) or stimulating the tongue during the day with a device (https://doi.org/10.3390/dsh79042009) have improved snoring in some individuals.    Remember to Drive Safe... Drive Alive     Sleep health profoundly affects your health, mood, and your safety.  Thirty three percent of the population (one in three of us) is not getting enough sleep and many have a sleep disorder. Not getting enough sleep or having an untreated / undertreated sleep condition may make us sleepy without even knowing it. In fact, our driving could be dramatically impaired due to our sleep health. As your provider, here are some things I would like you to know about driving:     Here are some warning signs for impairment and dangerous drowsy driving:               -Having been awake more than 16 hours               -Looking tired               -Eyelid drooping              -Head nodding (it could be too late at this point)              -Driving for more than 30 minutes     Some things you could do to make the driving safer if you are experiencing some drowsiness:              -Stop driving and rest              -Call for transportation              -Make sure your sleep disorder is adequately treated     Some things that have been shown NOT to work when experiencing drowsiness while driving:              -Turning on the radio              -Opening windows              -Eating any  distracting  /  entertaining  foods (e.g., sunflower seeds, candy, or any other)              -Talking on the phone      Your decision may not only impact your life, but also the life of others. Please, remember to drive safe for yourself and all of us.

## 2023-02-12 ENCOUNTER — HEALTH MAINTENANCE LETTER (OUTPATIENT)
Age: 48
End: 2023-02-12

## 2024-03-10 ENCOUNTER — HEALTH MAINTENANCE LETTER (OUTPATIENT)
Age: 49
End: 2024-03-10

## 2024-05-28 ENCOUNTER — MYC MEDICAL ADVICE (OUTPATIENT)
Dept: SLEEP MEDICINE | Facility: CLINIC | Age: 49
End: 2024-05-28
Payer: COMMERCIAL

## 2024-08-09 ENCOUNTER — TELEPHONE (OUTPATIENT)
Dept: NEUROSURGERY | Facility: CLINIC | Age: 49
End: 2024-08-09
Payer: COMMERCIAL

## 2024-08-09 NOTE — TELEPHONE ENCOUNTER
Spoke with Nevaeh.  We don't have the images from [place], but radiology reports don't indicate any acute changes on brain imaging and no neurosurgical concerns.  She does not follow with Neurology for migraines or seizures.    Offered to schedule with Keri Jesus NP on 8/14 or if they feel this is more urgent, they may be seen in the [place] ED.    Nevaeh will speak with her  and call back if she wants to schedule a clinic appt with us next week.

## 2024-08-09 NOTE — TELEPHONE ENCOUNTER
[facility] Call Center    Phone Message    May a detailed message be left on voicemail: yes     Reason for Call: Other: FYI message: Patient telephoned call center and reached pediatrics. Complained of 'extreme confusion' period four days ago, and of a 'headache' since yesterday, described this as 'very bad pain, very dizzy, top of head burning'. Sound of patient retching on phone. Writer asked if patient felt needed to attend ED, patient said no, 'I have been before and they call me an anomaly'. Patient requested Dr Hooks team call , and ended call. Writer called red flag line per protocol and provided above information.     Action Taken: Other: adult red flag line contacted    Travel Screening: Not Applicable     Date of Service:

## 2024-08-09 NOTE — TELEPHONE ENCOUNTER
Writer received call on [employer] ([facility]). Routed to Ped Neurosurgery.     Mariela Katz LPN  Neurosurgery

## 2024-08-09 NOTE — TELEPHONE ENCOUNTER
[facility] Call Center    Phone Message    May a detailed message be left on voicemail: yes     Reason for Call: Other: Patient called back still having bad symptoms. Patient is not sure what she should do.She is not sure if she should drive to the [facility]'s and go to the ER and be seen at the bigger hospital. Patient would like to speak with a nurse or provider asap. She had left a message earlier and got no phone call back.      Action Taken: Message routed to:  [facility]     Travel Screening: Not Applicable     Date of Service:

## 2024-08-10 ENCOUNTER — HOSPITAL ENCOUNTER (OUTPATIENT)
Facility: CLINIC | Age: 49
Setting detail: OBSERVATION
Discharge: HOME | End: 2024-08-11
Attending: EMERGENCY MEDICINE | Admitting: STUDENT IN AN ORGANIZED HEALTH CARE EDUCATION/TRAINING PROGRAM
Payer: COMMERCIAL

## 2024-08-10 ENCOUNTER — APPOINTMENT (OUTPATIENT)
Dept: MRI IMAGING | Facility: CLINIC | Age: 49
End: 2024-08-10
Attending: EMERGENCY MEDICINE
Payer: COMMERCIAL

## 2024-08-10 ENCOUNTER — TELEPHONE (OUTPATIENT)
Dept: NURSING | Facility: CLINIC | Age: 49
End: 2024-08-10
Payer: COMMERCIAL

## 2024-08-10 DIAGNOSIS — G43.911 INTRACTABLE MIGRAINE WITH STATUS MIGRAINOSUS, UNSPECIFIED MIGRAINE TYPE: Primary | ICD-10-CM

## 2024-08-10 PROBLEM — R56.9 SEIZURE-LIKE ACTIVITY (H): Status: ACTIVE | Noted: 2024-08-10

## 2024-08-10 PROBLEM — G43.909 MIGRAINE: Status: ACTIVE | Noted: 2024-08-10

## 2024-08-10 LAB
ALBUMIN SERPL BCG-MCNC: 4.7 G/DL (ref 3.5–5.2)
ALP SERPL-CCNC: 71 U/L (ref 40–150)
ALT SERPL W P-5'-P-CCNC: 17 U/L (ref 0–50)
ANION GAP SERPL CALCULATED.3IONS-SCNC: 12 MMOL/L (ref 7–15)
AST SERPL W P-5'-P-CCNC: 26 U/L (ref 0–45)
BASOPHILS # BLD AUTO: 0 10E3/UL (ref 0–0.2)
BASOPHILS NFR BLD AUTO: 1 %
BILIRUB SERPL-MCNC: 0.2 MG/DL
BUN SERPL-MCNC: 13.9 MG/DL (ref 6–20)
CALCIUM SERPL-MCNC: 9.2 MG/DL (ref 8.8–10.4)
CHLORIDE SERPL-SCNC: 102 MMOL/L (ref 98–107)
CREAT SERPL-MCNC: 0.74 MG/DL (ref 0.51–0.95)
EGFRCR SERPLBLD CKD-EPI 2021: >90 ML/MIN/1.73M2
EOSINOPHIL # BLD AUTO: 0.2 10E3/UL (ref 0–0.7)
EOSINOPHIL NFR BLD AUTO: 2 %
ERYTHROCYTE [DISTWIDTH] IN BLOOD BY AUTOMATED COUNT: 12.4 % (ref 10–15)
GLUCOSE BLDC GLUCOMTR-MCNC: 126 MG/DL (ref 70–99)
GLUCOSE SERPL-MCNC: 88 MG/DL (ref 70–99)
HCG INTACT+B SERPL-ACNC: <1 MIU/ML
HCO3 SERPL-SCNC: 23 MMOL/L (ref 22–29)
HCT VFR BLD AUTO: 45.1 % (ref 35–47)
HGB BLD-MCNC: 15.2 G/DL (ref 11.7–15.7)
IMM GRANULOCYTES # BLD: 0.1 10E3/UL
IMM GRANULOCYTES NFR BLD: 1 %
LYMPHOCYTES # BLD AUTO: 2.1 10E3/UL (ref 0.8–5.3)
LYMPHOCYTES NFR BLD AUTO: 29 %
MAGNESIUM SERPL-MCNC: 2.3 MG/DL (ref 1.7–2.3)
MCH RBC QN AUTO: 32.1 PG (ref 26.5–33)
MCHC RBC AUTO-ENTMCNC: 33.7 G/DL (ref 31.5–36.5)
MCV RBC AUTO: 95 FL (ref 78–100)
MONOCYTES # BLD AUTO: 0.4 10E3/UL (ref 0–1.3)
MONOCYTES NFR BLD AUTO: 6 %
NEUTROPHILS # BLD AUTO: 4.5 10E3/UL (ref 1.6–8.3)
NEUTROPHILS NFR BLD AUTO: 61 %
NRBC # BLD AUTO: 0 10E3/UL
NRBC BLD AUTO-RTO: 0 /100
PHOSPHATE SERPL-MCNC: 3.2 MG/DL (ref 2.5–4.5)
PLATELET # BLD AUTO: 389 10E3/UL (ref 150–450)
POTASSIUM SERPL-SCNC: 3.9 MMOL/L (ref 3.4–5.3)
PROT SERPL-MCNC: 7.7 G/DL (ref 6.4–8.3)
RBC # BLD AUTO: 4.74 10E6/UL (ref 3.8–5.2)
SODIUM SERPL-SCNC: 137 MMOL/L (ref 135–145)
WBC # BLD AUTO: 7.3 10E3/UL (ref 4–11)

## 2024-08-10 PROCEDURE — 70553 MRI BRAIN STEM W/O & W/DYE: CPT | Mod: 26 | Performed by: RADIOLOGY

## 2024-08-10 PROCEDURE — 36415 COLL VENOUS BLD VENIPUNCTURE: CPT | Performed by: EMERGENCY MEDICINE

## 2024-08-10 PROCEDURE — 96376 TX/PRO/DX INJ SAME DRUG ADON: CPT

## 2024-08-10 PROCEDURE — 255N000002 HC RX 255 OP 636: Performed by: EMERGENCY MEDICINE

## 2024-08-10 PROCEDURE — 80053 COMPREHEN METABOLIC PANEL: CPT | Performed by: EMERGENCY MEDICINE

## 2024-08-10 PROCEDURE — 96375 TX/PRO/DX INJ NEW DRUG ADDON: CPT

## 2024-08-10 PROCEDURE — 258N000003 HC RX IP 258 OP 636: Performed by: EMERGENCY MEDICINE

## 2024-08-10 PROCEDURE — 85025 COMPLETE CBC W/AUTO DIFF WBC: CPT | Performed by: EMERGENCY MEDICINE

## 2024-08-10 PROCEDURE — 250N000011 HC RX IP 250 OP 636: Performed by: EMERGENCY MEDICINE

## 2024-08-10 PROCEDURE — A9585 GADOBUTROL INJECTION: HCPCS | Performed by: EMERGENCY MEDICINE

## 2024-08-10 PROCEDURE — 84702 CHORIONIC GONADOTROPIN TEST: CPT | Performed by: EMERGENCY MEDICINE

## 2024-08-10 PROCEDURE — 82962 GLUCOSE BLOOD TEST: CPT

## 2024-08-10 PROCEDURE — 84100 ASSAY OF PHOSPHORUS: CPT | Performed by: EMERGENCY MEDICINE

## 2024-08-10 PROCEDURE — 250N000009 HC RX 250: Performed by: EMERGENCY MEDICINE

## 2024-08-10 PROCEDURE — 96365 THER/PROPH/DIAG IV INF INIT: CPT | Mod: 59

## 2024-08-10 PROCEDURE — 83735 ASSAY OF MAGNESIUM: CPT | Performed by: EMERGENCY MEDICINE

## 2024-08-10 PROCEDURE — 99285 EMERGENCY DEPT VISIT HI MDM: CPT | Mod: 25

## 2024-08-10 PROCEDURE — 70553 MRI BRAIN STEM W/O & W/DYE: CPT

## 2024-08-10 PROCEDURE — 96367 TX/PROPH/DG ADDL SEQ IV INF: CPT

## 2024-08-10 PROCEDURE — G0378 HOSPITAL OBSERVATION PER HR: HCPCS

## 2024-08-10 RX ORDER — ONDANSETRON 2 MG/ML
4 INJECTION INTRAMUSCULAR; INTRAVENOUS EVERY 6 HOURS PRN
Status: DISCONTINUED | OUTPATIENT
Start: 2024-08-10 | End: 2024-08-11 | Stop reason: HOSPADM

## 2024-08-10 RX ORDER — AMOXICILLIN 250 MG
1 CAPSULE ORAL 2 TIMES DAILY PRN
Status: DISCONTINUED | OUTPATIENT
Start: 2024-08-10 | End: 2024-08-11 | Stop reason: HOSPADM

## 2024-08-10 RX ORDER — LORAZEPAM 2 MG/ML
1 INJECTION INTRAMUSCULAR ONCE
Status: COMPLETED | OUTPATIENT
Start: 2024-08-10 | End: 2024-08-10

## 2024-08-10 RX ORDER — ONDANSETRON 4 MG/1
4 TABLET, ORALLY DISINTEGRATING ORAL EVERY 6 HOURS PRN
Status: DISCONTINUED | OUTPATIENT
Start: 2024-08-10 | End: 2024-08-11 | Stop reason: HOSPADM

## 2024-08-10 RX ORDER — GADOBUTROL 604.72 MG/ML
0.1 INJECTION INTRAVENOUS ONCE
Status: COMPLETED | OUTPATIENT
Start: 2024-08-10 | End: 2024-08-10

## 2024-08-10 RX ORDER — MAGNESIUM SULFATE HEPTAHYDRATE 40 MG/ML
2 INJECTION, SOLUTION INTRAVENOUS ONCE
Status: COMPLETED | OUTPATIENT
Start: 2024-08-10 | End: 2024-08-10

## 2024-08-10 RX ORDER — AMOXICILLIN 250 MG
1 CAPSULE ORAL 2 TIMES DAILY
Status: DISCONTINUED | OUTPATIENT
Start: 2024-08-10 | End: 2024-08-11 | Stop reason: HOSPADM

## 2024-08-10 RX ORDER — AMOXICILLIN 250 MG
2 CAPSULE ORAL 2 TIMES DAILY PRN
Status: DISCONTINUED | OUTPATIENT
Start: 2024-08-10 | End: 2024-08-11 | Stop reason: HOSPADM

## 2024-08-10 RX ORDER — LORAZEPAM 2 MG/ML
INJECTION INTRAMUSCULAR
Status: COMPLETED
Start: 2024-08-10 | End: 2024-08-10

## 2024-08-10 RX ORDER — AMOXICILLIN 250 MG
2 CAPSULE ORAL 2 TIMES DAILY
Status: DISCONTINUED | OUTPATIENT
Start: 2024-08-10 | End: 2024-08-11 | Stop reason: HOSPADM

## 2024-08-10 RX ADMIN — GADOBUTROL 6.8 ML: 604.72 INJECTION INTRAVENOUS at 19:40

## 2024-08-10 RX ADMIN — LORAZEPAM 1 MG: 2 INJECTION INTRAMUSCULAR; INTRAVENOUS at 20:21

## 2024-08-10 RX ADMIN — MAGNESIUM SULFATE HEPTAHYDRATE 2 G: 2 INJECTION, SOLUTION INTRAVENOUS at 19:02

## 2024-08-10 RX ADMIN — VALPROATE SODIUM 1000 MG: 100 INJECTION, SOLUTION INTRAVENOUS at 20:16

## 2024-08-10 RX ADMIN — LORAZEPAM 1 MG: 2 INJECTION INTRAMUSCULAR; INTRAVENOUS at 19:42

## 2024-08-10 ASSESSMENT — ACTIVITIES OF DAILY LIVING (ADL)
ADLS_ACUITY_SCORE: 35
ADLS_ACUITY_SCORE: 35
ADLS_ACUITY_SCORE: 33
ADLS_ACUITY_SCORE: 35

## 2024-08-10 ASSESSMENT — COLUMBIA-SUICIDE SEVERITY RATING SCALE - C-SSRS
1. IN THE PAST MONTH, HAVE YOU WISHED YOU WERE DEAD OR WISHED YOU COULD GO TO SLEEP AND NOT WAKE UP?: NO
6. HAVE YOU EVER DONE ANYTHING, STARTED TO DO ANYTHING, OR PREPARED TO DO ANYTHING TO END YOUR LIFE?: NO
2. HAVE YOU ACTUALLY HAD ANY THOUGHTS OF KILLING YOURSELF IN THE PAST MONTH?: NO

## 2024-08-10 NOTE — ED TRIAGE NOTES
"Triage Assessment & Note:    BP (!) 133/93   Pulse 114   Temp 98.1  F (36.7  C) (Oral)   Resp 16   Ht 1.626 m (5' 4\")   Wt 60.8 kg (134 lb)   SpO2 95%   BMI 23.00 kg/m        Patient presents with: Pt with complex neuro hx comes via wc to triage with reports of base of skull headache/neurology consult. No reports of fever, cough, SOB, or CP    Home Treatments/Remedies: home medications    Febrile / Afebrile: afebrile    Duration of C/o: 3 days    Migdalia Hernandez RN  August 10, 2024            "

## 2024-08-10 NOTE — CONSULTS
"[facility]  Neurology Consultation    Patient Name:  Nevaeh Graham  MRN:  [id number]    :  1975  Date of Service:  August 10, 2024  Primary care provider:  Miroslava Ellison      Neurology consultation service was asked to see Nevaeh Graham by Dr. Mckeon to evaluate migraine.    Chief Complaint: Headache    History of Present Illness:   Nevaeh Graham is a 49 year old female with history of Chiairi malformation with surgical decompression about 12 years ago, complex migraines, bipolar disorder, vitamin D deficiency, PTSD, anxiety, insomnia who presents with intractable migraine.    Patient tells me that for the last 6 months, she would have a \"migraine\" attack once every 2 to 3 years, however, over the past 6 months it is now occurring every month.  These attacks are similar-they last about a week at a time, 9-10/10 headache occipital region, sometimes pounding, associated with nausea and vomiting, dizziness, improved on resting and lying down, and associated with right facial droop, right upper and right lower extremity weakness and numbness that persist for the entire duration of the headache (around a week).    This time, a couple of things were different.  Firstly, per ,  noticed confusion on  that seem to progress until  where patient was not oriented to where she was, repeating sentences.  They went to a local ER where she had a CT/MRI brain which were unremarkable.  Then on the night of , she had the above-mentioned migraine attack.  Only different thing was that it was stabbing in nature and primarily in the neck, and also has stabbing pain in retro-orbital region, otherwise similar to her usual headaches, perhaps worse in intensity.  She also flew in today from [facility] to have it evaluated, en route, she noticed some clear fluid coming out of her nose and also salty fluid down the throat.  She has vomited 6 times.  She was told " by the neurosurgeon that she follows for her surgical decompression that she should come to the ER.    On Thursday, she was given a migraine cocktail by her primary provider which includes prednisone 60 mg up to twice daily, Flexeril up to once daily, Benadryl up to twice daily, riboflavin, magnesium, and hydrocodone 5 mg up to twice daily.  She has been taking these intermittently (not all at once) with minimal relief.  She also was taking Excedrin and ibuprofen every 4 hours.    She has 7 headache days per month (headache free for 3 weeks).  In terms of chronic preventatives, she was supposed to start Emgality on Monday but has not started it.  She has history of kidney stones, no history of asthma, and she has completed her family and does not plan to have anymore kids.    She currently still has some weakness on the right side but was able to walk to the bathroom.    No red flag features of headache-no fever/chills/claudication/vision loss/high-pressure headache signs/change in pattern of headache.    Per chart review, back in 2014, she was given IV tPA for similar symptoms.  She has had multiple MRIs, most recently on 8/6/2024 which were unremarkable.  She had CTV on April 2024 which was unremarkable.  She denies any significant changes in stressors.    The patient does have a history of posterior decompression of a Chiari I malformation in 2012. After her surgery, the headaches with Valsalva maneuvers improved.     ROS  A comprehensive ROS was performed and pertinent findings were included in HPI.     PMH  History reviewed. No pertinent past medical history.  History reviewed. No pertinent surgical history.    Medications   I have personally reviewed the patient's medication list.     Allergies  I have personally reviewed the patient's allergy list.     Social History  Recreational alcohol use, former smoking. No drugs    Family History    Did not ask      Physical Examination   Vitals: BP (!) 133/93   Pulse  "114   Temp 98.1  F (36.7  C) (Oral)   Resp 16   Ht 1.626 m (5' 4\")   Wt 60.8 kg (134 lb)   SpO2 95%   BMI 23.00 kg/m    General: Lying in bed, NAD  Head: NC/AT  Eyes: no icterus, op pink and moist  Cardiac: RRR. Extremities warm, no edema.   Respiratory: non-labored on RA  GI: S/NT/ND  Skin: No rash or lesion on exposed skin  Psych: Mood pleasant, affect congruent  Neuro:  Mental status: Awake, alert, attentive, oriented to self, time, place, and circumstance. Language is fluent and coherent with intact comprehension of complex commands, naming and repetition.    Prefers to keep eyes closed.  No neck stiffness/rigidity, Kernig's and Brudzinski absent  Cranial nerves: VFF, PERRL 3 mm, conjugate gaze, EOMI, facial sensation intact, face symmetric, shoulder shrug strong, tongue/uvula midline, no dysarthria.  Optic discs are visualized with no papilledema.  Motor: Normal bulk and tone. No abnormal movements.  She has full 5/5 strength in LUE and LLE.  On RUE and RLE, she has giveaway weakness but is 4/5 on finger extensors, elbow extension, hip flexion, knee extension, ankle plantarflexion and dorsiflexion.  Reflexes: Normo-reflexic and symmetric biceps, brachioradialis, triceps, patellae, and achilles. Negative Lugo, no clonus, toes down-going.  Sensory: Intact to light touch in all 4 extremities  Coordination: FNF and HS without ataxia or dysmetria. Rapid alternating movements intact.   Gait: Deferred.    Investigations   I have personally reviewed pertinent labs, tests, and radiological imaging. Discussion of notable findings is included under Impression.     Was patient transferred from outside hospital?   No    Impression  49F with Chiari malformation s/p decompression surgery in 2012, complex migraine (with right-sided weakness for which she has received tPA in the past), bipolar, anxiety, presents with status migrainosus over the past 4 days.    She has already tried multiple medications including " prednisone, Flexeril, Benadryl, riboflavin, hydrocodone, ibuprofen with minimal relief.  Her headache pattern has not changed, except that the headache was predated by a couple of days of confusion which is unusual, as well as there was a report that patient had clear drainage from her nose today en route.  As a result, CSF leak cannot be fully ruled out, particularly 1 that could be secondary to high pressure headache (which patient did not have any clinical symptoms of).  Overall the suspicion is pretty low that this is anything but her usual migraine headache (and she does seem to present with status migrainosus quite often), but it is still reasonable to get an MRI brain if we are not able to have the images from her outside hospital pushed over.  From a management perspective, she should be treated with headache cocktails, and we can start with Depakote.    Recommendations  -Depakote 1000 mg IV over 10 minutes.  -1 L of IV fluids and 2 g of magnesium.  -MRI brain with and without contrast to rule out IIH, evidence of CSF leak through the cribriform plates.  -The Depakote cocktail above can be repeated every 8 hours.  If she is having headaches more frequently, then these cocktails can be intertwined with a second headache cocktail of Benadryl 25-50 mg IV, Toradol 30 mg, Compazine 10 mg IV.    Thank you for involving Neurology in the care of Nevaeh Graham.  Please do not hesitate to call with questions/concerns (consult pager [phone]).      Patient was seen and discussed with Dr. Roman.    Lucrecia Gaitan MD

## 2024-08-10 NOTE — ED PROVIDER NOTES
ED Provider Note  [facility]      History     Chief Complaint   Patient presents with    Headache    Neurologic Problem    Dizziness    One-sided Weakness     HPI  Nevaeh Graham is a 49 year old female who presents to the emergency department with headache, dizziness, and unilateral weakness.  Patient has a longstanding history of complicated migraine, follows with neurology, presents today with concern for headache that is outside of the normal.  She does endorse some unilateral weakness on the right side which is consistent with what she reports is hemiplegic she has had with migraines in the past.  Once different today is that the duration and severity of the headache is more prominent, however she did get off an airplane and thinks that the pressure difference in the airplane exacerbated all of her symptoms.  She denies fevers or chills.  She denies any other additional focal weakness outside of what she typically gets with complicated migraines.  No additional new specific plaints at this time.    Past Medical History  History reviewed. No pertinent past medical history.  History reviewed. No pertinent surgical history.  aspirin (ASA) 81 MG EC tablet  LORazepam (ATIVAN) 1 MG tablet  risperiDONE (RISPERDAL) 1 MG tablet  zolpidem (AMBIEN) 5 MG tablet      Allergies   Allergen Reactions    Latex Hives and Rash    Tramadol Other (See Comments)     agitation and visual disturbance    Ketorolac Tromethamine Other (See Comments)     agitation      Metoclopramide Other (See Comments)     agitation       Family History  History reviewed. No pertinent family history.  Social History   Social History     Tobacco Use    Smoking status: Unknown      A medically appropriate review of systems was performed with pertinent positives and negatives noted in the HPI, and all other systems negative.    Physical Exam   BP: (!) 133/93  Pulse: 114  Temp: 98.1  F (36.7  C)  Resp: 16  Height: 162.6 cm (5'  "4\")  Weight: 60.8 kg (134 lb)  SpO2: 95 %  Physical Exam  Vitals and nursing note reviewed.   Constitutional:       General: She is not in acute distress.     Appearance: Normal appearance. She is not diaphoretic.   HENT:      Head: Normocephalic and atraumatic.      Mouth/Throat:      Mouth: Mucous membranes are moist.   Eyes:      General: No scleral icterus.     Conjunctiva/sclera: Conjunctivae normal.   Cardiovascular:      Rate and Rhythm: Normal rate and regular rhythm.      Heart sounds: Normal heart sounds.   Pulmonary:      Effort: Pulmonary effort is normal. No respiratory distress.      Breath sounds: Normal breath sounds.   Abdominal:      General: Abdomen is flat.   Musculoskeletal:      Cervical back: Neck supple.   Skin:     General: Skin is warm and dry.      Findings: No rash.   Neurological:      Mental Status: She is alert and oriented to person, place, and time.      Motor: Weakness present.      Comments: No facial droop. Right upper extremity  strength 4/5 and hip flexion 4/5. Left  strength and hip flexion 5/5.            ED Course, Procedures, & Data      Procedures                Results for orders placed or performed during the hospital encounter of 08/10/24   MR Brain w/o & w Contrast     Status: None    Narrative    EXAM: MR BRAIN W/O and W CONTRAST  LOCATION: [facility]  DATE: 8/10/2024    INDICATION: r o IIH CSF leak  COMPARISON: CT head 4/17/2024, MRI brain 1/16/2022  CONTRAST: 6.8 mL Gadavist  TECHNIQUE: Routine multiplanar multisequence head MRI without and with intravenous contrast.    FINDINGS:  INTRACRANIAL CONTENTS: No acute or subacute infarct. No mass, acute hemorrhage, or extra-axial fluid collections. Normal brain parenchymal signal. Normal ventricles and sulci. There are postsurgical changes including posterior fossa decompression for   Chiari I malformation. No pathologic contrast enhancement.    SELLA: Empty sella " morphology with flattening of the pituitary gland along the sellar floor.    OSSEOUS STRUCTURES/SOFT TISSUES: Normal marrow signal. The major intracranial vascular flow voids are maintained.     ORBITS: No abnormality accounting for technique.     SINUSES/MASTOIDS: No paranasal sinus mucosal disease. No middle ear or mastoid effusion.       Impression    IMPRESSION:  1.  Negative for acute intracranial abnormality.  2.  Similar postsurgical changes of posterior fossa decompression.     Comprehensive metabolic panel     Status: Normal   Result Value Ref Range    Sodium 137 135 - 145 mmol/L    Potassium 3.9 3.4 - 5.3 mmol/L    Carbon Dioxide (CO2) 23 22 - 29 mmol/L    Anion Gap 12 7 - 15 mmol/L    Urea Nitrogen 13.9 6.0 - 20.0 mg/dL    Creatinine 0.74 0.51 - 0.95 mg/dL    GFR Estimate >90 >60 mL/min/1.73m2    Calcium 9.2 8.8 - 10.4 mg/dL    Chloride 102 98 - 107 mmol/L    Glucose 88 70 - 99 mg/dL    Alkaline Phosphatase 71 40 - 150 U/L    AST 26 0 - 45 U/L    ALT 17 0 - 50 U/L    Protein Total 7.7 6.4 - 8.3 g/dL    Albumin 4.7 3.5 - 5.2 g/dL    Bilirubin Total 0.2 <=1.2 mg/dL   Magnesium     Status: Normal   Result Value Ref Range    Magnesium 2.3 1.7 - 2.3 mg/dL   Phosphorus     Status: Normal   Result Value Ref Range    Phosphorus 3.2 2.5 - 4.5 mg/dL   CBC with platelets and differential     Status: None   Result Value Ref Range    WBC Count 7.3 4.0 - 11.0 10e3/uL    RBC Count 4.74 3.80 - 5.20 10e6/uL    Hemoglobin 15.2 11.7 - 15.7 g/dL    Hematocrit 45.1 35.0 - 47.0 %    MCV 95 78 - 100 fL    MCH 32.1 26.5 - 33.0 pg    MCHC 33.7 31.5 - 36.5 g/dL    RDW 12.4 10.0 - 15.0 %    Platelet Count 389 150 - 450 10e3/uL    % Neutrophils 61 %    % Lymphocytes 29 %    % Monocytes 6 %    % Eosinophils 2 %    % Basophils 1 %    % Immature Granulocytes 1 %    NRBCs per 100 WBC 0 <1 /100    Absolute Neutrophils 4.5 1.6 - 8.3 10e3/uL    Absolute Lymphocytes 2.1 0.8 - 5.3 10e3/uL    Absolute Monocytes 0.4 0.0 - 1.3 10e3/uL     Absolute Eosinophils 0.2 0.0 - 0.7 10e3/uL    Absolute Basophils 0.0 0.0 - 0.2 10e3/uL    Absolute Immature Granulocytes 0.1 <=0.4 10e3/uL    Absolute NRBCs 0.0 10e3/uL   HCG quantitative pregnancy (blood)     Status: Normal   Result Value Ref Range    hCG Quantitative <1 <5 mIU/mL   Glucose by meter     Status: Abnormal   Result Value Ref Range    GLUCOSE BY METER POCT 126 (H) 70 - 99 mg/dL   CBC with platelets     Status: Normal   Result Value Ref Range    WBC Count 8.1 4.0 - 11.0 10e3/uL    RBC Count 4.26 3.80 - 5.20 10e6/uL    Hemoglobin 13.7 11.7 - 15.7 g/dL    Hematocrit 40.9 35.0 - 47.0 %    MCV 96 78 - 100 fL    MCH 32.2 26.5 - 33.0 pg    MCHC 33.5 31.5 - 36.5 g/dL    RDW 12.6 10.0 - 15.0 %    Platelet Count 358 150 - 450 10e3/uL   Basic metabolic panel     Status: Abnormal   Result Value Ref Range    Sodium 136 135 - 145 mmol/L    Potassium 4.3 3.4 - 5.3 mmol/L    Chloride 104 98 - 107 mmol/L    Carbon Dioxide (CO2) 21 (L) 22 - 29 mmol/L    Anion Gap 11 7 - 15 mmol/L    Urea Nitrogen 12.1 6.0 - 20.0 mg/dL    Creatinine 0.68 0.51 - 0.95 mg/dL    GFR Estimate >90 >60 mL/min/1.73m2    Calcium 8.5 (L) 8.8 - 10.4 mg/dL    Glucose 105 (H) 70 - 99 mg/dL   CBC with Platelets & Differential     Status: None    Narrative    The following orders were created for panel order CBC with Platelets & Differential.  Procedure                               Abnormality         Status                     ---------                               -----------         ------                     CBC with platelets and d...[[id number]]                      Final result                 Please view results for these tests on the individual orders.   EEG Video 2-12 HRS Ummonitored     Status: None    Narrative    EEG Video 2-12 HRS Ummonitored Result      [facility] EEG #  (In-Patient Video-EEG Monitoring)    Name:     Nevaeh Graham   MRN:       [id number]  :        1975    Procedure Date: 2024  Duration of Recording:  3  hours, 3 minutes.      CLINICAL HISTORY:  This diagnostic video-EEG monitoring procedure was   performed in evaluation of encephalopathy and seizures in Nevaeh Graham,   who is a 49-year-old woman.    The patient was reported to be receiving valproate at the time of this   recording.     TECHNICAL SUMMARY:  This continuous EEG monitoring procedure was performed   with 25 scalp electrodes in 10-20 system placements, and additional scalp,   precordial and other surface electrodes used for electrical referencing   and artifact detection.  A single channel of EKG was recorded for purposes   of analyzing EKG artifacts in the EEG channels.  Qualified technicians   attached EEG electrodes, set up the study, monitored and reviewed EEG   recordings and disconnected EEG electrodes as appropriate.  Video   monitoring was utilized and periodically reviewed by EEG technologist and   the physician for electroclinical correlation.    INTERICTAL EEG ACTIVITIES:  During waking, there was a symmetric,   well-modulated, approximately 10 Hz posterior dominant rhythm, which was   attenuated on eye opening.  Lower amplitude faster activities predominated   anteriorly.  Drowsiness was manifested by predominance of centrally   maximum semirhythmic theta slowing, dropout of the posterior dominant   rhythm during deeper drowsiness, and slow lateral eye movements.    Symmetric sleep spindles were seen during stage II sleep.      No interictal epileptiform abnormalities were recorded.    ICTAL RECORDINGS:  No electrographic seizures and no paroxysmal behavioral   events occurred during this procedure.     SUMMARY OF VIDEO-EEG MONITORING:    The interictal recording in waking, drowsiness and stage II sleep was   normal.  No pathological slowing, no interictal epileptiform   abnormalities, no electrographic seizures and no paroxysmal behavioral   events were recorded during the period of monitoring.   Clinical correlation is recommended.   Job  LUCILLE Willett M.D., Professor of Neurology      Medications   magnesium sulfate 2 g in 50 mL sterile water intermittent infusion (0 g Intravenous Stopped 8/10/24 2027)   LORazepam (ATIVAN) injection 1 mg (1 mg Intravenous $Given 8/10/24 2021)   valproate (DEPACON) 1,000 mg in sodium chloride 0.9 % 50 mL intermittent infusion (0 mg Intravenous Stopped 8/10/24 2033)   gadobutrol (GADAVIST) injection 6.08 mL (6.8 mLs Intravenous $Given 8/10/24 1940)   ondansetron (ZOFRAN) injection 4 mg (4 mg Intravenous $Given 8/11/24 1640)   sodium chloride 0.9% BOLUS 1,000 mL (1,000 mLs Intravenous $New Bag 8/11/24 1640)   valproate (DEPACON) 1,000 mg in sodium chloride 0.9 % 50 mL intermittent infusion (1,000 mg Intravenous $New Bag 8/11/24 1640)     Labs Ordered and Resulted from Time of ED Arrival to Time of ED Departure   GLUCOSE BY METER - Abnormal       Result Value    GLUCOSE BY METER POCT 126 (*)    COMPREHENSIVE METABOLIC PANEL - Normal    Sodium 137      Potassium 3.9      Carbon Dioxide (CO2) 23      Anion Gap 12      Urea Nitrogen 13.9      Creatinine 0.74      GFR Estimate >90      Calcium 9.2      Chloride 102      Glucose 88      Alkaline Phosphatase 71      AST 26      ALT 17      Protein Total 7.7      Albumin 4.7      Bilirubin Total 0.2     MAGNESIUM - Normal    Magnesium 2.3     PHOSPHORUS - Normal    Phosphorus 3.2     HCG QUANTITATIVE PREGNANCY - Normal    hCG Quantitative <1     CBC WITH PLATELETS AND DIFFERENTIAL    WBC Count 7.3      RBC Count 4.74      Hemoglobin 15.2      Hematocrit 45.1      MCV 95      MCH 32.1      MCHC 33.7      RDW 12.4      Platelet Count 389      % Neutrophils 61      % Lymphocytes 29      % Monocytes 6      % Eosinophils 2      % Basophils 1      % Immature Granulocytes 1      NRBCs per 100 WBC 0      Absolute Neutrophils 4.5      Absolute Lymphocytes 2.1      Absolute Monocytes 0.4      Absolute Eosinophils 0.2      Absolute Basophils 0.0      Absolute Immature Granulocytes 0.1       Absolute NRBCs 0.0       MR Brain w/o & w Contrast   Final Result   IMPRESSION:   1.  Negative for acute intracranial abnormality.   2.  Similar postsurgical changes of posterior fossa decompression.                Critical care was not performed.     Medical Decision Making  The patient's presentation was of high complexity (a chronic illness severe exacerbation, progression, or side effect of treatment).    The patient's evaluation involved:  review of external note(s) from 1 sources (see separate area of note for details)  review of 3+ test result(s) ordered prior to this encounter (see separate area of note for details)  ordering and/or review of 3+ test(s) in this encounter (see separate area of note for details)  discussion of management or test interpretation with another health professional (see separate area of note for details)    The patient's management necessitated further care after sign-out to oncoming ED physician (see their note for further management).    Assessment & Plan    Nevaeh Graham is a 49 year old female who presents to the emergency department with headache, dizziness, and unilateral weakness.  Patient is nontoxic-appearing on exam, has vital signs within normal limits.  She does exhibit some right-sided weakness on strength assessment today which is consistent with when she gets with her complicated migraines.  Given the complexity of her history and her complaint today, neurology was consulted for help with evaluation and management.  Patient signed out pending neurology recommendations for further care.    I have reviewed the nursing notes. I have reviewed the findings, diagnosis, plan and need for follow up with the patient.    Discharge Medication List as of 8/11/2024  6:10 PM          Final diagnoses:   Intractable migraine with status migrainosus, unspecified migraine type       Zach Mckeon MD  [facility] EMERGENCY DEPARTMENT  8/10/2024     Zach Mckeon  MD MORA  08/12/24 [id number]

## 2024-08-10 NOTE — TELEPHONE ENCOUNTER
calling for wife who is on her way to the hospital with migraine headache. Wanted to give ER patient information as she is expected to be there soon.  Writer advised  that no pre-admission information is needed in ER and they will care for her as soon as she arrives and get her information then.  No triage.

## 2024-08-11 ENCOUNTER — APPOINTMENT (OUTPATIENT)
Dept: NEUROLOGY | Facility: CLINIC | Age: 49
End: 2024-08-11
Attending: STUDENT IN AN ORGANIZED HEALTH CARE EDUCATION/TRAINING PROGRAM
Payer: COMMERCIAL

## 2024-08-11 VITALS
HEIGHT: 64 IN | RESPIRATION RATE: 18 BRPM | OXYGEN SATURATION: 96 % | WEIGHT: 134 LBS | DIASTOLIC BLOOD PRESSURE: 89 MMHG | BODY MASS INDEX: 22.88 KG/M2 | TEMPERATURE: 98.2 F | HEART RATE: 115 BPM | SYSTOLIC BLOOD PRESSURE: 116 MMHG

## 2024-08-11 PROBLEM — F44.4 CONVERSION DISORDER WITH MOTOR SYMPTOM OR DEFICIT: Status: ACTIVE | Noted: 2024-08-11

## 2024-08-11 LAB
ANION GAP SERPL CALCULATED.3IONS-SCNC: 11 MMOL/L (ref 7–15)
BUN SERPL-MCNC: 12.1 MG/DL (ref 6–20)
CALCIUM SERPL-MCNC: 8.5 MG/DL (ref 8.8–10.4)
CHLORIDE SERPL-SCNC: 104 MMOL/L (ref 98–107)
CREAT SERPL-MCNC: 0.68 MG/DL (ref 0.51–0.95)
EGFRCR SERPLBLD CKD-EPI 2021: >90 ML/MIN/1.73M2
ERYTHROCYTE [DISTWIDTH] IN BLOOD BY AUTOMATED COUNT: 12.6 % (ref 10–15)
GLUCOSE SERPL-MCNC: 105 MG/DL (ref 70–99)
HCO3 SERPL-SCNC: 21 MMOL/L (ref 22–29)
HCT VFR BLD AUTO: 40.9 % (ref 35–47)
HGB BLD-MCNC: 13.7 G/DL (ref 11.7–15.7)
MCH RBC QN AUTO: 32.2 PG (ref 26.5–33)
MCHC RBC AUTO-ENTMCNC: 33.5 G/DL (ref 31.5–36.5)
MCV RBC AUTO: 96 FL (ref 78–100)
PLATELET # BLD AUTO: 358 10E3/UL (ref 150–450)
POTASSIUM SERPL-SCNC: 4.3 MMOL/L (ref 3.4–5.3)
RBC # BLD AUTO: 4.26 10E6/UL (ref 3.8–5.2)
SODIUM SERPL-SCNC: 136 MMOL/L (ref 135–145)
WBC # BLD AUTO: 8.1 10E3/UL (ref 4–11)

## 2024-08-11 PROCEDURE — 250N000009 HC RX 250

## 2024-08-11 PROCEDURE — 85027 COMPLETE CBC AUTOMATED: CPT

## 2024-08-11 PROCEDURE — 99207 EEG VIDEO 2-12 HRS UNMONITORED: CPT | Performed by: PSYCHIATRY & NEUROLOGY

## 2024-08-11 PROCEDURE — G0378 HOSPITAL OBSERVATION PER HR: HCPCS

## 2024-08-11 PROCEDURE — 258N000003 HC RX IP 258 OP 636

## 2024-08-11 PROCEDURE — 95718 EEG PHYS/QHP 2-12 HR W/VEEG: CPT | Performed by: PSYCHIATRY & NEUROLOGY

## 2024-08-11 PROCEDURE — 95711 VEEG 2-12 HR UNMONITORED: CPT

## 2024-08-11 PROCEDURE — 96376 TX/PRO/DX INJ SAME DRUG ADON: CPT

## 2024-08-11 PROCEDURE — 250N000013 HC RX MED GY IP 250 OP 250 PS 637

## 2024-08-11 PROCEDURE — 96375 TX/PRO/DX INJ NEW DRUG ADDON: CPT

## 2024-08-11 PROCEDURE — 36415 COLL VENOUS BLD VENIPUNCTURE: CPT

## 2024-08-11 PROCEDURE — 99235 HOSP IP/OBS SAME DATE MOD 70: CPT | Mod: 25 | Performed by: STUDENT IN AN ORGANIZED HEALTH CARE EDUCATION/TRAINING PROGRAM

## 2024-08-11 PROCEDURE — 82374 ASSAY BLOOD CARBON DIOXIDE: CPT

## 2024-08-11 PROCEDURE — 250N000011 HC RX IP 250 OP 636

## 2024-08-11 RX ORDER — ACETAMINOPHEN 325 MG/1
650 TABLET ORAL EVERY 4 HOURS PRN
Status: DISCONTINUED | OUTPATIENT
Start: 2024-08-11 | End: 2024-08-11 | Stop reason: HOSPADM

## 2024-08-11 RX ORDER — ACETAMINOPHEN 325 MG/10.15ML
650 LIQUID ORAL EVERY 4 HOURS PRN
Status: CANCELLED | OUTPATIENT
Start: 2024-08-11

## 2024-08-11 RX ORDER — DIPHENHYDRAMINE HCL 25 MG
25 CAPSULE ORAL ONCE
Status: DISCONTINUED | OUTPATIENT
Start: 2024-08-11 | End: 2024-08-11

## 2024-08-11 RX ORDER — ACETAMINOPHEN 650 MG/1
650 SUPPOSITORY RECTAL EVERY 4 HOURS PRN
Status: DISCONTINUED | OUTPATIENT
Start: 2024-08-11 | End: 2024-08-11 | Stop reason: HOSPADM

## 2024-08-11 RX ORDER — ONDANSETRON 2 MG/ML
4 INJECTION INTRAMUSCULAR; INTRAVENOUS ONCE
Status: COMPLETED | OUTPATIENT
Start: 2024-08-11 | End: 2024-08-11

## 2024-08-11 RX ADMIN — ACETAMINOPHEN 650 MG: 325 TABLET ORAL at 13:14

## 2024-08-11 RX ADMIN — ONDANSETRON 4 MG: 2 INJECTION INTRAMUSCULAR; INTRAVENOUS at 16:40

## 2024-08-11 RX ADMIN — SODIUM CHLORIDE 1000 ML: 9 INJECTION, SOLUTION INTRAVENOUS at 16:40

## 2024-08-11 RX ADMIN — SENNOSIDES AND DOCUSATE SODIUM 2 TABLET: 8.6; 5 TABLET ORAL at 09:30

## 2024-08-11 RX ADMIN — ACETAMINOPHEN 650 MG: 325 TABLET ORAL at 09:33

## 2024-08-11 RX ADMIN — VALPROATE SODIUM 1000 MG: 100 INJECTION, SOLUTION INTRAVENOUS at 16:40

## 2024-08-11 ASSESSMENT — ACTIVITIES OF DAILY LIVING (ADL)
ADLS_ACUITY_SCORE: 36
ADLS_ACUITY_SCORE: 37
ADLS_ACUITY_SCORE: 36
ADLS_ACUITY_SCORE: 37
ADLS_ACUITY_SCORE: 33
ADLS_ACUITY_SCORE: 33
ADLS_ACUITY_SCORE: 36
ADLS_ACUITY_SCORE: 35
ADLS_ACUITY_SCORE: 33
ADLS_ACUITY_SCORE: 36
ADLS_ACUITY_SCORE: 33
ADLS_ACUITY_SCORE: 36
ADLS_ACUITY_SCORE: 33
ADLS_ACUITY_SCORE: 37
ADLS_ACUITY_SCORE: 36
ADLS_ACUITY_SCORE: 36
ADLS_ACUITY_SCORE: 33
ADLS_ACUITY_SCORE: 36

## 2024-08-11 NOTE — PROGRESS NOTES
Discharge instruction reviewed.  Patient verbalized understanding. Belongings returned to pt. PIV removed, patient taken to the main lobby via WC. Family awaiting at main lobby. Patient discharged

## 2024-08-11 NOTE — PROGRESS NOTES
PRIMARY DIAGNOSIS: ACUTE PAIN  OUTPATIENT/OBSERVATION GOALS TO BE MET BEFORE DISCHARGE:  1. Pain Status: Improved-controlled with oral pain medications.    2. Return to near baseline physical activity: No    3. Cleared for discharge by consultants (if involved): No    Discharge Planner Nurse   Safe discharge environment identified: No  Barriers to discharge: No       Entered by: Milagros Stephenson RN 08/11/2024 2:30 PM     Please review provider order for any additional goals.   Nurse to notify provider when observation goals have been met and patient is ready for discharge.

## 2024-08-11 NOTE — PLAN OF CARE
"Observation Goal Outcome Evaluation:      Overall Patient Progress: no changeOverall Patient Progress: no change    BP (!) 131/90 (BP Location: Right arm)   Pulse 97   Temp 98.1  F (36.7  C) (Oral)   Resp 18   Ht 1.626 m (5' 4\")   Wt 60.8 kg (134 lb)   SpO2 96%   BMI 23.00 kg/m        -vital signs normal or at patient baseline: Progressing    -Diagnostic tests completed: Not met    Pt is hooked up to EEG and resting comfortably in bed.      "

## 2024-08-11 NOTE — H&P
[facility] [place]  Neurology History and Physical    Patient Name:  Nevaeh Graham  MRN:  [id number]    :  1975  Date of Admission:  8/10/2024  Date of Service:  August 10, 2024  Primary care provider:  Miroslava Ellison      Chief Complaint:  Migraine and AMS with seizure-like activity     History of Present Illness:   Nevaeh Graham is a 49 year old female with history of Chiairi malformation with surgical decompression about 12 years ago, complex migraines, bipolar disorder, vitamin D deficiency, PTSD, anxiety, insomnia who presented with intractable migraine.     Rest of the extensive migraine hx as per consult note from Dr Lucrecia Gaitan.     I was paged for 2 spells concerning for seizure like activity for further evaluation. First spell:  The patient was in the MRI scanner. She went unresponsive for 5 minutes. Patient did not seem to be confused afterwards. The second spells was in the ED, witnessed by the ED provider. He mentioned that she had extensor posturing in b/l LE. There were no shaking episodes. Her HR was 117. She was protecting her airway. She was refusing nasal cannula and talking. She did not have tongue bite or loss of bladder and bowel incontinence. She was given 2mg ativan.     When I went to see her, she did not remember the spells. She did not remember being in the MRI scanner. She could not tell me her prior history of seizures and told that she remembers her husbands number by heart and can give the number for further questions. She was alert but could not tell me month and year. She could follow complex commands and could say days of the week backwards.     Per  who came to the bedside later , patient had prior seizure like episodes several years back.     Per chart review, she has hx of conversion disorder and non-epileptic spells. She had multiple EEG but they were normal. Unclear if the spells were captured.     ROS: A comprehensive ROS was  "performed and pertinent findings were included in HPI.     PMH:  History reviewed. No pertinent past medical history.  History reviewed. No pertinent surgical history.    Medications   I have personally reviewed the patient's medication list.     Allergies  I have personally reviewed the patient's allergy list.         Physical Examination:   Constitutional   - Vitals: BP (!) 131/96   Pulse 106   Temp 98.1  F (36.7  C) (Oral)   Resp 18   Ht 1.626 m (5' 4\")   Wt 60.8 kg (134 lb)   SpO2 100%   BMI 23.00 kg/m     - General: Lying in bed, NAD  Head: NC/AT  Eyes: no icterus, op pink and moist  Cardiac: RRR. Extremities warm, no edema.   Respiratory: non-labored on RA  GI: S/NT/ND  Skin: No rash or lesion on exposed skin  Psych: Mood pleasant, affect congruent  Neuro:  Mental status: Awake, oriented to self, could not tell month or year even with options, could say days of the week backwards with encouragement, could do cross-body commnads  Cranial nerves: VFF, PERRL, conjugate gaze, EOMI, facial sensation intact, face symmetric, shoulder shrug strong, tongue/uvula midline, no dysarthria.   Motor: Normal bulk and tone. No abnormal movements. 5/5 strength LUE and LLE, give away weakness and drift (which she self corrects) on RUE  Reflexes:Did not check  Sensory: Decrease light touch on right side  Coordination: Not performed   Gait: Deferred    Investigations:    I have personally reviewed pertinent labs, tests, and radiology images. Discussion of notable findings is included under Impression.     Did the patient transfer from an outside hospital?   No    Assessment:      #Migraine :   #Seizure like activity, c/f pseudoseizure  Patient had two spells, one with unresponsiveness and second with lower extremity posturing but no tongue bite/bladder or bowel incontinence. Patient was slightly confused when I assessed her. Per chart review and talking to the , it came to our attention that she has history of " non-epileptic spells and possibly conversion disorder. At this point, its hard to rule out if it was epileptic spells but the suspicion for non-epileptic spells are higher. Regardless, she is getting Depakote for migraine, which would help with spells too, if they were epileptic. Recommend not treating her with ativan an paging neurology stat, if she were to have new spells. Will consider EEG in the morning, if she does not improve.    Secondly, there are concerns for empty sella with flattening of pituitary gland. The questions of pseudotumor cerebri remains a possibility. Will consider lumbar puncture with opening pressure in the morning.     Plan:  -s/p Depakote 1000mg IV over 10mins, 1L IVF and 2g Mag  -The Depakote cocktail above can be repeated every 8 hours. If she is having headaches more frequently, then these cocktails can be intertwined with a second headache cocktail of Benadryl 25-50 mg IV, Toradol 30 mg, Compazine 10 mg IV.   -Avoid giving ativan, if there is concerns for seizures, page general neurology stat  - Will consider Lumbar puncture or EEG in am depending on re-evaluation     #history of Chiairi malformation   -NTD    #Anxiety  #Bipolar disorder  -Holding pta ativan, zolpidem and risperidone for now       FEN: Regular diet  Malnutrition: Patient does not meet two of the above criteria necessary for diagnosing malnutrition  PPx: DVT (pneumatic compression device)  Code: Full    Patient was seen and discussed with Dr. Roman.    Amrik Silverman MD, MS  Neurology PGY3

## 2024-08-11 NOTE — ED PROVIDER NOTES
"     Emergency Department Patient Sign-out       Brief HPI:  This is a 49 year old female signed out to me by previous ED provider.  See initial ED Provider note for details of the presentation.     Significant Events prior to my assuming care: Per epic      Exam:   Patient Vitals for the past 24 hrs:   BP Temp Temp src Pulse Resp SpO2 Height Weight   08/10/24 2346 110/84 97.9  F (36.6  C) Oral 108 -- 94 % -- --   08/10/24 2245 106/77 -- -- 106 -- 91 % -- --   08/10/24 2215 118/80 -- -- 98 -- 96 % -- --   08/10/24 2200 (!) 117/95 -- -- 99 -- 95 % -- --   08/10/24 2045 (!) 131/96 -- -- 106 -- 100 % -- --   08/10/24 2030 (!) 139/92 -- -- 99 -- 100 % -- --   08/10/24 2029 (!) 141/99 -- -- 99 18 100 % -- --   08/10/24 2019 -- -- -- 114 18 99 % -- --   08/10/24 141 (!) 133/93 98.1  F (36.7  C) Oral 114 16 95 % 1.626 m (5' 4\") 60.8 kg (134 lb)           ED RESULTS:   Results for orders placed or performed during the hospital encounter of 08/10/24 (from the past 24 hour(s))   Neurology General Adult IP Consult: complex migraines; Consultant may enter orders: Yes; Requesting provider? ED Provider     Status: None ()    Collection Time: 08/10/24  2:50 PM    Lucrecia Bear MD     8/10/2024  7:55 PM  [facility]  Neurology Consultation    Patient Name:  Nevaeh Graham  MRN:  [id number]    :  1975  Date of Service:  August 10, 2024  Primary care provider:  Miroslava Ellison      Neurology consultation service was asked to see Nevaeh Graham by   Dr. Mckeon to evaluate migraine.    Chief Complaint: Headache    History of Present Illness:   Nevaeh Graham is a 49 year old female with history of Chiairi   malformation with surgical decompression about 12 years ago,   complex migraines, bipolar disorder, vitamin D deficiency, PTSD,   anxiety, insomnia who presents with intractable migraine.    Patient tells me that for the last 6 months, she would have a   \"migraine\" attack " once every 2 to 3 years, however, over the past   6 months it is now occurring every month.  These attacks are   similar-they last about a week at a time, 9-10/10 headache   occipital region, sometimes pounding, associated with nausea and   vomiting, dizziness, improved on resting and lying down, and   associated with right facial droop, right upper and right lower   extremity weakness and numbness that persist for the entire   duration of the headache (around a week).    This time, a couple of things were different.  Firstly, per   ,  noticed confusion on 8/4 that seem to progress   until 8/7 where patient was not oriented to where she was,   repeating sentences.  They went to a local ER where she had a   CT/MRI brain which were unremarkable.  Then on the night of 8/7,   she had the above-mentioned migraine attack.  Only different   thing was that it was stabbing in nature and primarily in the   neck, and also has stabbing pain in retro-orbital region,   otherwise similar to her usual headaches, perhaps worse in   intensity.  She also flew in today from [place] to have   it evaluated, en route, she noticed some clear fluid coming out   of her nose and also salty fluid down the throat.  She has   vomited 6 times.  She was told by the neurosurgeon that she   follows for her surgical decompression that she should come to   the ER.    On Thursday, she was given a migraine cocktail by her primary   provider which includes prednisone 60 mg up to twice daily,   Flexeril up to once daily, Benadryl up to twice daily,   riboflavin, magnesium, and hydrocodone 5 mg up to twice daily.    She has been taking these intermittently (not all at once) with   minimal relief.  She also was taking Excedrin and ibuprofen every   4 hours.    She has 7 headache days per month (headache free for 3 weeks).    In terms of chronic preventatives, she was supposed to start   Emgality on Monday but has not started it.  She  "has history of   kidney stones, no history of asthma, and she has completed her   family and does not plan to have anymore kids.    She currently still has some weakness on the right side but was   able to walk to the bathroom.    No red flag features of headache-no   fever/chills/claudication/vision loss/high-pressure headache   signs/change in pattern of headache.    Per chart review, back in 2014, she was given IV tPA for similar   symptoms.  She has had multiple MRIs, most recently on 8/6/2024   which were unremarkable.  She had CTV on April 2024 which was   unremarkable.  She denies any significant changes in stressors.    The patient does have a history of posterior decompression of a   Chiari I malformation in 2012. After her surgery, the headaches   with Valsalva maneuvers improved.     ROS  A comprehensive ROS was performed and pertinent findings   were included in HPI.     PMH  History reviewed. No pertinent past medical history.  History reviewed. No pertinent surgical history.    Medications   I have personally reviewed the patient's medication list.     Allergies  I have personally reviewed the patient's allergy list.     Social History  Recreational alcohol use, former smoking. No drugs    Family History    Did not ask      Physical Examination   Vitals: BP (!) 133/93   Pulse 114   Temp 98.1  F (36.7  C)   (Oral)   Resp 16   Ht 1.626 m (5' 4\")   Wt 60.8 kg (134 lb)     SpO2 95%   BMI 23.00 kg/m    General: Lying in bed, NAD  Head: NC/AT  Eyes: no icterus, op pink and moist  Cardiac: RRR. Extremities warm, no edema.   Respiratory: non-labored on RA  GI: S/NT/ND  Skin: No rash or lesion on exposed skin  Psych: Mood pleasant, affect congruent  Neuro:  Mental status: Awake, alert, attentive, oriented to self, time,   place, and circumstance. Language is fluent and coherent with   intact comprehension of complex commands, naming and repetition.    Prefers to keep eyes closed.  No neck " stiffness/rigidity, Kernig's and Brudzinski absent  Cranial nerves: VFF, PERRL 3 mm, conjugate gaze, EOMI, facial   sensation intact, face symmetric, shoulder shrug strong,   tongue/uvula midline, no dysarthria.  Optic discs are visualized   with no papilledema.  Motor: Normal bulk and tone. No abnormal movements.  She has full   5/5 strength in LUE and LLE.  On RUE and RLE, she has giveaway weakness but is 4/5 on finger   extensors, elbow extension, hip flexion, knee extension, ankle   plantarflexion and dorsiflexion.  Reflexes: Normo-reflexic and symmetric biceps, brachioradialis,   triceps, patellae, and achilles. Negative Lugo, no clonus,   toes down-going.  Sensory: Intact to light touch in all 4 extremities  Coordination: FNF and HS without ataxia or dysmetria. Rapid   alternating movements intact.   Gait: Deferred.    Investigations   I have personally reviewed pertinent labs, tests, and   radiological imaging. Discussion of notable findings is included   under Impression.     Was patient transferred from outside hospital?   No    Impression  49F with Chiari malformation s/p decompression surgery in 2012,   complex migraine (with right-sided weakness for which she has   received tPA in the past), bipolar, anxiety, presents with status   migrainosus over the past 4 days.    She has already tried multiple medications including prednisone,   Flexeril, Benadryl, riboflavin, hydrocodone, ibuprofen with   minimal relief.  Her headache pattern has not changed, except   that the headache was predated by a couple of days of confusion   which is unusual, as well as there was a report that patient had   clear drainage from her nose today en route.  As a result, CSF   leak cannot be fully ruled out, particularly 1 that could be   secondary to high pressure headache (which patient did not have   any clinical symptoms of).  Overall the suspicion is pretty low   that this is anything but her usual migraine headache (and  she   does seem to present with status migrainosus quite often), but it   is still reasonable to get an MRI brain if we are not able to   have the images from her outside hospital pushed over.  From a   management perspective, she should be treated with headache   cocktails, and we can start with Depakote.    Recommendations  -Depakote 1000 mg IV over 10 minutes.  -1 L of IV fluids and 2 g of magnesium.  -MRI brain with and without contrast to rule out IIH, evidence of   CSF leak through the cribriform plates.  -The Depakote cocktail above can be repeated every 8 hours.  If   she is having headaches more frequently, then these cocktails can   be intertwined with a second headache cocktail of Benadryl 25-50   mg IV, Toradol 30 mg, Compazine 10 mg IV.    Thank you for involving Neurology in the care of Nevaeh Graham.    Please do not hesitate to call with questions/concerns (consult   pager [phone]).      Patient was seen and discussed with Dr. Roman.    Lucrecia Gaitan MD      CBC with Platelets & Differential     Status: None    Collection Time: 08/10/24  3:13 PM    Narrative    The following orders were created for panel order CBC with Platelets & Differential.  Procedure                               Abnormality         Status                     ---------                               -----------         ------                     CBC with platelets and d...[[id number]]                      Final result                 Please view results for these tests on the individual orders.   Comprehensive metabolic panel     Status: Normal    Collection Time: 08/10/24  3:13 PM   Result Value Ref Range    Sodium 137 135 - 145 mmol/L    Potassium 3.9 3.4 - 5.3 mmol/L    Carbon Dioxide (CO2) 23 22 - 29 mmol/L    Anion Gap 12 7 - 15 mmol/L    Urea Nitrogen 13.9 6.0 - 20.0 mg/dL    Creatinine 0.74 0.51 - 0.95 mg/dL    GFR Estimate >90 >60 mL/min/1.73m2    Calcium 9.2 8.8 - 10.4 mg/dL    Chloride 102 98 - 107 mmol/L    Glucose  88 70 - 99 mg/dL    Alkaline Phosphatase 71 40 - 150 U/L    AST 26 0 - 45 U/L    ALT 17 0 - 50 U/L    Protein Total 7.7 6.4 - 8.3 g/dL    Albumin 4.7 3.5 - 5.2 g/dL    Bilirubin Total 0.2 <=1.2 mg/dL   Magnesium     Status: Normal    Collection Time: 08/10/24  3:13 PM   Result Value Ref Range    Magnesium 2.3 1.7 - 2.3 mg/dL   Phosphorus     Status: Normal    Collection Time: 08/10/24  3:13 PM   Result Value Ref Range    Phosphorus 3.2 2.5 - 4.5 mg/dL   CBC with platelets and differential     Status: None    Collection Time: 08/10/24  3:13 PM   Result Value Ref Range    WBC Count 7.3 4.0 - 11.0 10e3/uL    RBC Count 4.74 3.80 - 5.20 10e6/uL    Hemoglobin 15.2 11.7 - 15.7 g/dL    Hematocrit 45.1 35.0 - 47.0 %    MCV 95 78 - 100 fL    MCH 32.1 26.5 - 33.0 pg    MCHC 33.7 31.5 - 36.5 g/dL    RDW 12.4 10.0 - 15.0 %    Platelet Count 389 150 - 450 10e3/uL    % Neutrophils 61 %    % Lymphocytes 29 %    % Monocytes 6 %    % Eosinophils 2 %    % Basophils 1 %    % Immature Granulocytes 1 %    NRBCs per 100 WBC 0 <1 /100    Absolute Neutrophils 4.5 1.6 - 8.3 10e3/uL    Absolute Lymphocytes 2.1 0.8 - 5.3 10e3/uL    Absolute Monocytes 0.4 0.0 - 1.3 10e3/uL    Absolute Eosinophils 0.2 0.0 - 0.7 10e3/uL    Absolute Basophils 0.0 0.0 - 0.2 10e3/uL    Absolute Immature Granulocytes 0.1 <=0.4 10e3/uL    Absolute NRBCs 0.0 10e3/uL   HCG quantitative pregnancy (blood)     Status: Normal    Collection Time: 08/10/24  3:13 PM   Result Value Ref Range    hCG Quantitative <1 <5 mIU/mL   Glucose by meter     Status: Abnormal    Collection Time: 08/10/24  8:17 PM   Result Value Ref Range    GLUCOSE BY METER POCT 126 (H) 70 - 99 mg/dL   MR Brain w/o & w Contrast     Status: None    Collection Time: 08/10/24  8:26 PM    Narrative    EXAM: MR BRAIN W/O and W CONTRAST  LOCATION: [facility]  DATE: 8/10/2024    INDICATION: r o IIH CSF leak  COMPARISON: CT head 4/17/2024, MRI brain 1/16/2022  CONTRAST:  6.8 mL Gadavist  TECHNIQUE: Routine multiplanar multisequence head MRI without and with intravenous contrast.    FINDINGS:  INTRACRANIAL CONTENTS: No acute or subacute infarct. No mass, acute hemorrhage, or extra-axial fluid collections. Normal brain parenchymal signal. Normal ventricles and sulci. There are postsurgical changes including posterior fossa decompression for   Chiari I malformation. No pathologic contrast enhancement.    SELLA: Empty sella morphology with flattening of the pituitary gland along the sellar floor.    OSSEOUS STRUCTURES/SOFT TISSUES: Normal marrow signal. The major intracranial vascular flow voids are maintained.     ORBITS: No abnormality accounting for technique.     SINUSES/MASTOIDS: No paranasal sinus mucosal disease. No middle ear or mastoid effusion.       Impression    IMPRESSION:  1.  Negative for acute intracranial abnormality.  2.  Similar postsurgical changes of posterior fossa decompression.         ED MEDICATIONS:   Medications   senna-docusate (SENOKOT-S/PERICOLACE) 8.6-50 MG per tablet 1 tablet (has no administration in time range)     Or   senna-docusate (SENOKOT-S/PERICOLACE) 8.6-50 MG per tablet 2 tablet (has no administration in time range)   ondansetron (ZOFRAN ODT) ODT tab 4 mg (has no administration in time range)     Or   ondansetron (ZOFRAN) injection 4 mg (has no administration in time range)   melatonin tablet 5 mg (has no administration in time range)   senna-docusate (SENOKOT-S/PERICOLACE) 8.6-50 MG per tablet 1 tablet (1 tablet Oral Not Given 8/10/24 2340)     Or   senna-docusate (SENOKOT-S/PERICOLACE) 8.6-50 MG per tablet 2 tablet ( Oral See Alternative 8/10/24 2340)   magnesium sulfate 2 g in 50 mL sterile water intermittent infusion (0 g Intravenous Stopped 8/10/24 2027)   LORazepam (ATIVAN) injection 1 mg (1 mg Intravenous $Given 8/10/24 2021)   valproate (DEPACON) 1,000 mg in sodium chloride 0.9 % 50 mL intermittent infusion (0 mg Intravenous Stopped  8/10/24 2033)   gadobutrol (GADAVIST) injection 6.08 mL (6.8 mLs Intravenous $Given 8/10/24 1940)         Impression:  No diagnosis found.    Plan:    Pending studies include neurology consult who recommended MRI of the brain.  While in the MRI table patient was observed to have some jerking motions consistent with reported, patient returned back to the emergency department given Ativan 1 mg IV.  Neurology consulted to reevaluate patient with decision to admit to their service for further evaluation care.      MD Terell Lloyd, Jaqueline MESSER MD  08/11/24 0050

## 2024-08-11 NOTE — ED NOTES
Patient arrives to ER from MRI per MRI tech patient was 3 minutes from being done with scan when she called and began to shake and then per tech patient was unresponsive. Patient arrives airway intact with non-labored breathing and not responsive to staff questions.

## 2024-08-11 NOTE — PLAN OF CARE
"Observation Goal Outcome Evaluation:      Overall Patient Progress: improvingOverall Patient Progress: improving    /89 (BP Location: Right arm)   Pulse 115   Temp 98.2  F (36.8  C) (Oral)   Resp 18   Ht 1.626 m (5' 4\")   Wt 60.8 kg (134 lb)   SpO2 96%   BMI 23.00 kg/m          -vital signs normal or at patient baseline: Met    -Diagnostic tests completed: Not met      Pt is receiving depacon infusion prior to discharge.       "

## 2024-08-11 NOTE — PLAN OF CARE
"Observation Goal Outcome Evaluation:      Overall Patient Progress: no changeOverall Patient Progress: no change    /88 (BP Location: Right arm)   Pulse 101   Temp 98.2  F (36.8  C)   Resp 18   Ht 1.626 m (5' 4\")   Wt 60.8 kg (134 lb)   SpO2 95%   BMI 23.00 kg/m      -vital signs normal or at patient baseline: Progressing    -Diagnostic tests completed: Not met    Pt woke to take am meds. Pt rated 7/10 HA. PRN tylenol administered. Will continue to monitor pt's pain. Encouraged pt to order breakfast.  "

## 2024-08-12 NOTE — DISCHARGE SUMMARY
[facility]  General Neurology Discharge Summary    Patient Name:  Nevaeh Graham  MRN:  [id number]    :  1975  Date of Service: 2024      Date of Admission:  8/10/2024  Date of Discharge::  2024  6:36 PM  Admitting Physician:  Singh Roman MD  Discharge Physician:    Primary Care Provider:   Miroslava Ellison  Discharge Disposition:   Discharged to home    Admission Diagnoses:  -Migraine   -Seizure like activity, c/f pseudoseizure    Discharge Diagnoses:    -Status migrainosus  -Nonepileptic spells   -History of Chiairi malformation  -Anxiety  -Bipolar disorder      Brief History of Illness:   49 year old woman with history of Chiairi malformation with surgical decompression about 12 years ago, complex migraines, bipolar disorder, vitamin D deficiency, PTSD, anxiety, insomnia who presented with status migrainosus with features different than her typical migraine. 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 3h EEG without evidence of seizure or signs that would suggest increased risk for seizures.     MRI brain and orbits performed without acute intracranial pathology. Did show empty sella sign. Although patient endorsed some blurry vision and R ear fullness v.s. tinnitus, fundoscopic exam was normal without evidence of papilledema. Furthermore, headaches actually improved with laying flat; thus ultimately did not feel symptoms were consistent with idiopathic intracranial hypertension.    Different features in migraine this time: stabbing pain at the base of the skull associated with stabbing pains radiating down to her shoulder (never had stabbing pain and location at skull base is different). Pain occurring for the past 3 days. Also endorses R sided weakness but on exam with significant motor impersistence and giveway weakness of her R hemibody suggestive  of functional weakness. She was at other times seen picking objects off her table and walking to the bathroom without difficulty.    Spell 1: The patient was in the MRI scanner. She went unresponsive for 5 minutes. Patient did not seem to be confused afterwards     Spell 2: Witnessed by ED provider. Extensor posturing in b/l LE. There were no shaking episodes. Tachycardic to 117. Refusing nasal cannula and talking. Did not have tongue bite or loss of bladder and bowel incontinence    Spell 3: witnessed by  in her room. Lasting < 10 seconds. Flexor clonic activity of her bilateral arms. Slept afterwards but c/b by recently getting ativan 2mg.  states these same spells have occurred in the past for which he is not concerned - he reports she has had many EEG's in the past without evidence of seizures    Family Hx:  Daughter has also had history of both epileptic and non-epileptic spells post TBI    Hospital Course:  #Status migrainosus   -s/p Depakote 1g x 2 doses over 48 hours with some relief   -s/p 2g IV Magnesium x 1   -s/p zofran 4mg x 1   -Ok to start Emgality when she gets home (both her and  were confused and thought they had to wait until she was headache free for a certain number of days before starting)  -Follow up with headache neurologist outpatient in 4-6 weeks from discharge      #Nonepileptic spells   Patient does endorse chronic, significant stressors as a , business owner, 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   - Follow up with established psychologist outpatient   - Follow up with established psychiatrist outpatient     #Concern for Functional R hemibody weakness   Low concern that reported R hemibody weakness this admission is consistent with hemiplegic migraine given no family history of hemiplegic migraine and exam with significant motor impersistence / giveway weakness     #History of Chiairi malformation   -NTD      #Anxiety  #Bipolar disorder  -Pta ativan, zolpidem and risperidone resumed at discharge     -PTA aspirin 81 mg daily (unclear why patient is on aspirin)    Pertinent Investigations:  MRI Brain with and without contrast   IMPRESSION:  1.  Negative for acute intracranial abnormality.  2.  Similar postsurgical changes of posterior fossa decompression.  3. Empty Sella morphology    Consultations:    None    Discharge Procedures:     Adult Neurology  Referral      Activity    Your activity upon discharge: activity as tolerated     Follow Up and recommended labs and tests    Follow up with primary care provider, Mrioslava Ellison, within 7 days for hospital follow- up.  No follow up labs or test are needed.    Follow up with headache neurology in 4-6 weeks after discharge for follow up     Reason for your hospital stay    You were hospitalized for persistent migraine as well as non-epileptic spells involving weakness of the right side of the body. The non-epileptic spells are most likely a manifestation of stressors in your life.  You should follow-up with psychology for consideration of starting cognitive behavioral therapies to help with these spells.  Additionally, continue to follow-up with your psychiatry for optimization of your mental health.  It is okay to start taking Emgality as prescribed by your outpatient provider when you get home for improved migraine control.  He received an MRI brain imaging during this hospital stay which did not show any concerning features. 3 hour EEG was obtained - final read pending but we have low concern for true seizures, thus ok to discharge home. If final read comes back with anything concerning, we will call you over the phone to prescribe a new medication.     Diet    Follow this diet upon discharge: Orders Placed This Encounter      Regular Diet Adult       Discharge Medications:  Discharge Medication List as of 8/11/2024  6:10 PM        CONTINUE these medications  "which have NOT CHANGED    Details   aspirin (ASA) 81 MG EC tablet Take 81 mg by mouth daily, Historical      LORazepam (ATIVAN) 1 MG tablet Take 1 tablet (1 mg) by mouth once as needed for anxiety, Disp-2 tablet, R-0, E-Prescribe      risperiDONE (RISPERDAL) 1 MG tablet Take 2 mg by mouth, Historical      zolpidem (AMBIEN) 5 MG tablet Take 5 mg by mouth, Historical             Medication changes:  None    Discharge physical examination:   Vitals:  B/P: 116/89, T: 98.2, P: 115, R: 18  General: Lying in bed in a dark room  Head: NC/AT  Eyes: no icterus  Cardiac: Tachycardic per vitals  Respiratory: non-labored on RA  GI: non-distended   Skin: No rash or lesion on exposed skin  Psych: Mood pleasant, affect congruent  Neuro:  Mental status: Awake, alert, attentive, oriented to self, time, place. Does not remember the spells that occurred overnight. Language is fluent and coherent.  Cranial nerves: PERRL 4 mm, normal fundoscopic exam with bilateral clear optic disc margins - no papilledema, conjugate gaze, EOMI, face symmetric, no dysarthria.   Motor: Full 5/5 strength in LUE and LLE.  On RUE and RLE, she has giveaway weakness throughout but at best I was able to get 5/5 strength throughout R hemibody aside from 4/5 R ankle dorsiflexion - however, patient was able to ambulate to restroom without difficulty, reassuring against true weakness  Reflexes: Normo-reflexic and symmetric biceps, brachioradialis, triceps, patellae, and achilles. No clonus, toes down-going.  Sensory: Intact to light touch in all 4 extremities  Coordination: L & R FNF without ataxia or dysmetria but required significant coaching to correctly perform R FNF testing (due to subjective \"weakness\")  Gait: Deferred.      Discharge follow up and instructions:    1.  Diet:   Regular  2.  Activity:  Activity as tolerated  3.  Restrictions:  None  4.  Follow up:  Follow up with primary care provider in 7 days after discharge. Follow up with headache " neurologist in 4-6 weeks after discharge.    Patient seen and discussed with Dr. Jessie Perla MD  PGY-4 Neurology Resident

## 2024-10-02 ENCOUNTER — TELEPHONE (OUTPATIENT)
Dept: NEUROLOGY | Facility: CLINIC | Age: 49
End: 2024-10-02
Payer: COMMERCIAL

## 2024-10-02 ENCOUNTER — HOSPITAL ENCOUNTER (OUTPATIENT)
Age: 49
End: 2024-10-02
Payer: COMMERCIAL

## 2024-10-03 NOTE — TELEPHONE ENCOUNTER
Spoke with ED provider in [place]    Patient having spells consistent with PNES spells seen during recent August admission.  Seems these have been worked up extensively with normal EEG, MRI in the past.    Patient endorses new stressors to the ED.  She is having spells despite Keppra load and a fair amount of ativan further suggesting PNES.  She also is coming to after very quickly (no post ictal state) similar to previous events.  No tongue biting or incontinence further suggestive of PNES    She has a headache.  Recommended migraine cocktail.  Discussed suspicion for functional weakness previously as well.  I think adding depakote as previously done would be a good idea to break the cycle.     The ED will keep me in the loop.   Observing her locally would be reasonable if these persist.  If they persist despite Keppra (would not continue), ativan and depakote further much less likely to be seizures.      Jesus Pina MD   of Neurology  [facility]

## 2025-02-16 ENCOUNTER — HEALTH MAINTENANCE LETTER (OUTPATIENT)
Age: 50
End: 2025-02-16

## 2025-03-16 ENCOUNTER — HEALTH MAINTENANCE LETTER (OUTPATIENT)
Age: 50
End: 2025-03-16